# Patient Record
Sex: FEMALE | Race: BLACK OR AFRICAN AMERICAN | NOT HISPANIC OR LATINO | URBAN - METROPOLITAN AREA
[De-identification: names, ages, dates, MRNs, and addresses within clinical notes are randomized per-mention and may not be internally consistent; named-entity substitution may affect disease eponyms.]

---

## 2024-09-18 ENCOUNTER — INPATIENT (INPATIENT)
Facility: HOSPITAL | Age: 74
LOS: 4 days | Discharge: HOME | End: 2024-09-23
Attending: HOSPITALIST | Admitting: HOSPITALIST
Payer: MEDICARE

## 2024-09-18 VITALS
HEART RATE: 94 BPM | WEIGHT: 194.01 LBS | OXYGEN SATURATION: 99 % | SYSTOLIC BLOOD PRESSURE: 119 MMHG | TEMPERATURE: 98 F | DIASTOLIC BLOOD PRESSURE: 90 MMHG | RESPIRATION RATE: 20 BRPM

## 2024-09-18 DIAGNOSIS — Z79.899 OTHER LONG TERM (CURRENT) DRUG THERAPY: ICD-10-CM

## 2024-09-18 DIAGNOSIS — R06.02 SHORTNESS OF BREATH: ICD-10-CM

## 2024-09-18 DIAGNOSIS — Z29.9 ENCOUNTER FOR PROPHYLACTIC MEASURES, UNSPECIFIED: ICD-10-CM

## 2024-09-18 DIAGNOSIS — I50.9 HEART FAILURE, UNSPECIFIED: ICD-10-CM

## 2024-09-18 DIAGNOSIS — I10 ESSENTIAL (PRIMARY) HYPERTENSION: ICD-10-CM

## 2024-09-18 LAB
ALBUMIN SERPL ELPH-MCNC: 3.6 G/DL — SIGNIFICANT CHANGE UP (ref 3.3–5)
ALP SERPL-CCNC: 77 U/L — SIGNIFICANT CHANGE UP (ref 40–120)
ALT FLD-CCNC: 40 U/L — HIGH (ref 4–33)
ANION GAP SERPL CALC-SCNC: 14 MMOL/L — SIGNIFICANT CHANGE UP (ref 7–14)
AST SERPL-CCNC: 45 U/L — HIGH (ref 4–32)
BASOPHILS # BLD AUTO: 0.03 K/UL — SIGNIFICANT CHANGE UP (ref 0–0.2)
BASOPHILS NFR BLD AUTO: 0.6 % — SIGNIFICANT CHANGE UP (ref 0–2)
BILIRUB SERPL-MCNC: 0.7 MG/DL — SIGNIFICANT CHANGE UP (ref 0.2–1.2)
BLD GP AB SCN SERPL QL: NEGATIVE — SIGNIFICANT CHANGE UP
BUN SERPL-MCNC: 22 MG/DL — SIGNIFICANT CHANGE UP (ref 7–23)
CALCIUM SERPL-MCNC: 8.5 MG/DL — SIGNIFICANT CHANGE UP (ref 8.4–10.5)
CHLORIDE SERPL-SCNC: 102 MMOL/L — SIGNIFICANT CHANGE UP (ref 98–107)
CO2 SERPL-SCNC: 22 MMOL/L — SIGNIFICANT CHANGE UP (ref 22–31)
CREAT SERPL-MCNC: 1.16 MG/DL — SIGNIFICANT CHANGE UP (ref 0.5–1.3)
EGFR: 49 ML/MIN/1.73M2 — LOW
EOSINOPHIL # BLD AUTO: 0 K/UL — SIGNIFICANT CHANGE UP (ref 0–0.5)
EOSINOPHIL NFR BLD AUTO: 0 % — SIGNIFICANT CHANGE UP (ref 0–6)
GLUCOSE SERPL-MCNC: 110 MG/DL — HIGH (ref 70–99)
HCT VFR BLD CALC: 31 % — LOW (ref 34.5–45)
HGB BLD-MCNC: 10.5 G/DL — LOW (ref 11.5–15.5)
IANC: 3.47 K/UL — SIGNIFICANT CHANGE UP (ref 1.8–7.4)
IMM GRANULOCYTES NFR BLD AUTO: 0.2 % — SIGNIFICANT CHANGE UP (ref 0–0.9)
LYMPHOCYTES # BLD AUTO: 0.87 K/UL — LOW (ref 1–3.3)
LYMPHOCYTES # BLD AUTO: 18.2 % — SIGNIFICANT CHANGE UP (ref 13–44)
MCHC RBC-ENTMCNC: 29.6 PG — SIGNIFICANT CHANGE UP (ref 27–34)
MCHC RBC-ENTMCNC: 33.9 GM/DL — SIGNIFICANT CHANGE UP (ref 32–36)
MCV RBC AUTO: 87.3 FL — SIGNIFICANT CHANGE UP (ref 80–100)
MONOCYTES # BLD AUTO: 0.4 K/UL — SIGNIFICANT CHANGE UP (ref 0–0.9)
MONOCYTES NFR BLD AUTO: 8.4 % — SIGNIFICANT CHANGE UP (ref 2–14)
NEUTROPHILS # BLD AUTO: 3.47 K/UL — SIGNIFICANT CHANGE UP (ref 1.8–7.4)
NEUTROPHILS NFR BLD AUTO: 72.6 % — SIGNIFICANT CHANGE UP (ref 43–77)
NRBC # BLD: 0 /100 WBCS — SIGNIFICANT CHANGE UP (ref 0–0)
NRBC # FLD: 0 K/UL — SIGNIFICANT CHANGE UP (ref 0–0)
NT-PROBNP SERPL-SCNC: HIGH PG/ML
PLATELET # BLD AUTO: 181 K/UL — SIGNIFICANT CHANGE UP (ref 150–400)
POTASSIUM SERPL-MCNC: 4.6 MMOL/L — SIGNIFICANT CHANGE UP (ref 3.5–5.3)
POTASSIUM SERPL-SCNC: 4.6 MMOL/L — SIGNIFICANT CHANGE UP (ref 3.5–5.3)
PROT SERPL-MCNC: 7 G/DL — SIGNIFICANT CHANGE UP (ref 6–8.3)
RBC # BLD: 3.55 M/UL — LOW (ref 3.8–5.2)
RBC # FLD: 17.7 % — HIGH (ref 10.3–14.5)
RH IG SCN BLD-IMP: POSITIVE — SIGNIFICANT CHANGE UP
SODIUM SERPL-SCNC: 138 MMOL/L — SIGNIFICANT CHANGE UP (ref 135–145)
TROPONIN T, HIGH SENSITIVITY RESULT: 28 NG/L — SIGNIFICANT CHANGE UP
TROPONIN T, HIGH SENSITIVITY RESULT: 29 NG/L — SIGNIFICANT CHANGE UP
WBC # BLD: 4.78 K/UL — SIGNIFICANT CHANGE UP (ref 3.8–10.5)
WBC # FLD AUTO: 4.78 K/UL — SIGNIFICANT CHANGE UP (ref 3.8–10.5)

## 2024-09-18 PROCEDURE — 99285 EMERGENCY DEPT VISIT HI MDM: CPT

## 2024-09-18 PROCEDURE — 71046 X-RAY EXAM CHEST 2 VIEWS: CPT | Mod: 26

## 2024-09-18 PROCEDURE — 71250 CT THORAX DX C-: CPT | Mod: 26

## 2024-09-18 PROCEDURE — 93970 EXTREMITY STUDY: CPT | Mod: 26

## 2024-09-18 PROCEDURE — 99223 1ST HOSP IP/OBS HIGH 75: CPT

## 2024-09-18 RX ORDER — LOSARTAN POTASSIUM 100 MG/1
25 TABLET, FILM COATED ORAL DAILY
Refills: 0 | Status: DISCONTINUED | OUTPATIENT
Start: 2024-09-18 | End: 2024-09-20

## 2024-09-18 RX ORDER — FUROSEMIDE 10 MG/ML
40 INJECTION INTRAVENOUS DAILY
Refills: 0 | Status: DISCONTINUED | OUTPATIENT
Start: 2024-09-19 | End: 2024-09-19

## 2024-09-18 RX ORDER — FUROSEMIDE 10 MG/ML
20 INJECTION INTRAVENOUS
Refills: 0 | Status: DISCONTINUED | OUTPATIENT
Start: 2024-09-18 | End: 2024-09-18

## 2024-09-18 RX ORDER — FUROSEMIDE 10 MG/ML
20 INJECTION INTRAVENOUS ONCE
Refills: 0 | Status: DISCONTINUED | OUTPATIENT
Start: 2024-09-18 | End: 2024-09-19

## 2024-09-18 RX ORDER — ENOXAPARIN SODIUM 150 MG/ML
40 INJECTION SUBCUTANEOUS EVERY 24 HOURS
Refills: 0 | Status: DISCONTINUED | OUTPATIENT
Start: 2024-09-18 | End: 2024-09-22

## 2024-09-18 NOTE — ED PROVIDER NOTE - PROGRESS NOTE DETAILS
labs revealing BNP 17K, CXR revealing b/l effusions, us negative for DVT. plan for admission for new onset heart failure. case d/w teledoc. given ?opacity on CXR, they would like a CT noncon as well. no fevers/leukocytosis. do not suspect PNA vs infectious etiology at this time.

## 2024-09-18 NOTE — H&P ADULT - NSHPPHYSICALEXAM_GEN_ALL_CORE
PHYSICAL EXAM:      Constitutional: NAD, well-groomed, well-developed  HEENT:  EOMI, Normal Hearing  Neck: No LAD, No JVD  Back: Normal spine flexure, No CVA tenderness  Respiratory: CTAB  Cardiovascular: S1 and S2, RRR  Gastrointestinal: BS+, soft, NT/ND  Extremities: + peripheral edema  Vascular: 2+ peripheral pulses  Neurological: A/O x 3, no focal deficits  Psychiatric: Normal mood, normal affect  Musculoskeletal: 5/5 strength b/l upper and lower extremities  Skin: No rashes

## 2024-09-18 NOTE — ED PROVIDER NOTE - OBJECTIVE STATEMENT
74-year-old female PMH of hypertension on losartan, presenting to ED with increasing shortness of breath/dyspnea on exertion over past 3 days associated with bilateral lower extremity edema.  Patient states her leg swelling normally resolves after elevating her feet however they did not resolve and worsened this morning in addition to her breathing prompting ER eval.  Patient denies any chest pain, fevers, cough, sore throat, and/V/D/C, abdominal pain, recent illness.  Patient otherwise denies any recent travel, long car rides, leg swelling, prolonged immobilization, recent surgeries, history of DVT/PE or exogenous hormone use.

## 2024-09-18 NOTE — ED PROVIDER NOTE - PHYSICAL EXAMINATION
CONSTITUTIONAL: Well-appearing; well-nourished; in no apparent distress;  HEAD: Normocephalic, atraumatic;  NECK/LYMPH: Supple; non-tender;  CARD: Normal S1, S2; no murmurs, rubs, or gallops noted  RESP: Normal chest excursion with respiration; breath sounds clear and equal bilaterally; faint crackles at bases b/l  EXT/MS: moves all extremities; distal pulses are normal; 2+ b/l pitting edema  SKIN: Normal for age and race; warm; dry; good turgor; no apparent lesions or exudate noted  NEURO: Awake, alert, oriented x 3, no gross deficits  PSYCH: Normal mood; appropriate affect

## 2024-09-18 NOTE — ED PROVIDER NOTE - ATTENDING APP SHARED VISIT CONTRIBUTION OF CARE
74-year-old female PMH of hypertension on losartan, presenting to ED with increasing shortness of breath/dyspnea on exertion over past 3 days associated with bilateral lower extremity edema.      pt well appearing  satting well on RA  lungs faint crackles at bases  2+ b/l pitting edema  ECG with inferolateral TWI (unknown Prior)  will check labs, trop to r/o atypical acs, bnp, CXR to r/o pleural effusions, US to r/o DVT and likely CDU vs teledoc admission

## 2024-09-18 NOTE — ED ADULT NURSE NOTE - NSFALLUNIVINTERV_ED_ALL_ED
Bed/Stretcher in lowest position, wheels locked, appropriate side rails in place/Call bell, personal items and telephone in reach/Instruct patient to call for assistance before getting out of bed/chair/stretcher/Non-slip footwear applied when patient is off stretcher/[place] to call system/Physically safe environment - no spills, clutter or unnecessary equipment/Purposeful proactive rounding/Room/bathroom lighting operational, light cord in reach

## 2024-09-18 NOTE — ED ADULT NURSE NOTE - OBJECTIVE STATEMENT
pt received spot 17a. pt A+Ox3 c/o GREY and b/l lower extremity edema. respirations even and unlabored. pt denies chest pain. vs as noted. labs sent. IVSL in place. NAD noted. will monitor.

## 2024-09-18 NOTE — ED PROVIDER NOTE - CLINICAL SUMMARY MEDICAL DECISION MAKING FREE TEXT BOX
74-year-old female PMH of hypertension on losartan, presenting to ED with increasing shortness of breath/dyspnea on exertion over past 3 days associated with bilateral lower extremity edema.      pt well appearing  satting well on RA  lungs faint crackles at bases  2+ b/l pitting edema  ECG with inferolateral TWI (unknown Prior)  will check labs, trop to r/o atypical acs, bnp, CXR, and likely CDU vs teledoc admission 74-year-old female PMH of hypertension on losartan, presenting to ED with increasing shortness of breath/dyspnea on exertion over past 3 days associated with bilateral lower extremity edema.      pt well appearing  satting well on RA  lungs faint crackles at bases  2+ b/l pitting edema  ECG with inferolateral TWI (unknown Prior)  will check labs, trop to r/o atypical acs, bnp, CXR to r/o pleural effusions, US to r/o DVT and likely CDU vs teledoc admission

## 2024-09-18 NOTE — H&P ADULT - PROBLEM SELECTOR PLAN 1
-clinical presentation very much suggestive of new-onset chf of unclear etiology  -pt well- appearing, hemodynamically stable. Will trial 20  po lasix bid; titrate up or transition to IV as needed  - Is/Os  -tele, tte -clinical presentation very much suggestive of new-onset chf of unclear etiology  -pt well- appearing, hemodynamically stable. Will trial 20  IV lasix x 1, start 40 po lasix tomorrow;  titrate up or transition to IV as needed  - Is/Os  -tele, tte Equal and normal pulses (carotid, femoral, dorsalis pedis)

## 2024-09-18 NOTE — H&P ADULT - NSHPLABSRESULTS_GEN_ALL_CORE
10.5   4.78  )-----------( 181      ( 18 Sep 2024 11:34 )             31.0     09-18    138  |  102  |  22  ----------------------------<  110[H]  4.6   |  22  |  1.16    Ca    8.5      18 Sep 2024 11:34    TPro  7.0  /  Alb  3.6  /  TBili  0.7  /  DBili  x   /  AST  45[H]  /  ALT  40[H]  /  AlkPhos  77  09-18    CAPILLARY BLOOD GLUCOSE          Urinalysis Basic - ( 18 Sep 2024 11:34 )    Color: x / Appearance: x / SG: x / pH: x  Gluc: 110 mg/dL / Ketone: x  / Bili: x / Urobili: x   Blood: x / Protein: x / Nitrite: x   Leuk Esterase: x / RBC: x / WBC x   Sq Epi: x / Non Sq Epi: x / Bacteria: x      Vital Signs Last 24 Hrs  T(C): 36.8 (18 Sep 2024 16:48), Max: 36.8 (18 Sep 2024 16:48)  T(F): 98.3 (18 Sep 2024 16:48), Max: 98.3 (18 Sep 2024 16:48)  HR: 92 (18 Sep 2024 16:48) (85 - 94)  BP: 136/91 (18 Sep 2024 16:48) (119/90 - 136/91)  BP(mean): --  RR: 16 (18 Sep 2024 16:48) (16 - 20)  SpO2: 96% (18 Sep 2024 16:48) (96% - 99%)    Parameters below as of 18 Sep 2024 16:48  Patient On (Oxygen Delivery Method): room air

## 2024-09-18 NOTE — H&P ADULT - NSHPREVIEWOFSYSTEMS_GEN_ALL_CORE
Review of Systems:   CONSTITUTIONAL: No fever  EYES: No eye pain, visual disturbances, or discharge  ENMT:  No difficulty hearing, tinnitus, vertigo; No sinus or throat pain  NECK: No pain or stiffness  RESPIRATORY: No cough, wheezing, chills or hemoptysis; exertional  shortness of breath and when supine  CARDIOVASCULAR: No chest pain, palpitations, dizziness; +leg swelling  GASTROINTESTINAL: No abdominal or epigastric pain. No nausea, vomiting, or hematemesis; No diarrhea or constipation. No melena or hematochezia.  GENITOURINARY: No dysuria  NEUROLOGICAL: No headaches  MUSCULOSKELETAL: No joint pain or swelling; No muscle, back, or extremity pain

## 2024-09-18 NOTE — H&P ADULT - HISTORY OF PRESENT ILLNESS
74-year-old female PMH of hypertension on losartan, presenting to ED with increasing shortness of breath/dyspnea on exertion/ orthopnea  over past 3 days associated with bilateral lower extremity edema.  Patient states her leg swelling normally resolves after elevating her feet however they did not resolve and worsened this morning in addition to her breathing prompting ER eval.  Patient denies any chest pain, fevers, cough, sore throat, and/V/D/C, abdominal pain, recent illness.  Patient otherwise denies any recent travel, long car rides, leg swelling, prolonged immobilization, recent surgeries, history of DVT/PE or exogenous hormone use. Duplex neg for dvt. EKG NSR with lateral twi (no prior for comparison) Trop 29, 28; pBNP  approx 18k. CXR + BL pl eff, R>L  Denies h/o cad, or prior cad workup

## 2024-09-18 NOTE — ED ADULT NURSE REASSESSMENT NOTE - NS ED NURSE REASSESS COMMENT FT1
Break RN: Patient resting in stretcher, at baseline mental status, no acute distress noted at this time. Respirations equal and unlabored. Care plan continued. Comfort measures provided. Safety maintained. Awaiting CT and bed placement.

## 2024-09-18 NOTE — ED ADULT TRIAGE NOTE - CHIEF COMPLAINT QUOTE
Pt presenting to ED for GREY and BI lateral lower extremity swelling starting 3 days ago. Phx HTN, Pt breathing mildly labored in triage, 02 99% on RA.

## 2024-09-19 LAB
A1C WITH ESTIMATED AVERAGE GLUCOSE RESULT: 6 % — HIGH (ref 4–5.6)
APTT BLD: 28.1 SEC — SIGNIFICANT CHANGE UP (ref 24.5–35.6)
BASOPHILS # BLD AUTO: 0.04 K/UL — SIGNIFICANT CHANGE UP (ref 0–0.2)
BASOPHILS NFR BLD AUTO: 0.7 % — SIGNIFICANT CHANGE UP (ref 0–2)
CHOLEST SERPL-MCNC: 153 MG/DL — SIGNIFICANT CHANGE UP
EOSINOPHIL # BLD AUTO: 0.15 K/UL — SIGNIFICANT CHANGE UP (ref 0–0.5)
EOSINOPHIL NFR BLD AUTO: 2.8 % — SIGNIFICANT CHANGE UP (ref 0–6)
ESTIMATED AVERAGE GLUCOSE: 126 — SIGNIFICANT CHANGE UP
HCT VFR BLD CALC: 30.9 % — LOW (ref 34.5–45)
HDLC SERPL-MCNC: 43 MG/DL — LOW
HGB BLD-MCNC: 10.3 G/DL — LOW (ref 11.5–15.5)
IANC: 3.09 K/UL — SIGNIFICANT CHANGE UP (ref 1.8–7.4)
IMM GRANULOCYTES NFR BLD AUTO: 0.2 % — SIGNIFICANT CHANGE UP (ref 0–0.9)
INR BLD: 1.24 RATIO — HIGH (ref 0.85–1.18)
LIPID PNL WITH DIRECT LDL SERPL: 94 MG/DL — SIGNIFICANT CHANGE UP
LYMPHOCYTES # BLD AUTO: 1.58 K/UL — SIGNIFICANT CHANGE UP (ref 1–3.3)
LYMPHOCYTES # BLD AUTO: 29.3 % — SIGNIFICANT CHANGE UP (ref 13–44)
MAGNESIUM SERPL-MCNC: 2 MG/DL — SIGNIFICANT CHANGE UP (ref 1.6–2.6)
MCHC RBC-ENTMCNC: 28.9 PG — SIGNIFICANT CHANGE UP (ref 27–34)
MCHC RBC-ENTMCNC: 33.3 GM/DL — SIGNIFICANT CHANGE UP (ref 32–36)
MCV RBC AUTO: 86.6 FL — SIGNIFICANT CHANGE UP (ref 80–100)
MONOCYTES # BLD AUTO: 0.52 K/UL — SIGNIFICANT CHANGE UP (ref 0–0.9)
MONOCYTES NFR BLD AUTO: 9.6 % — SIGNIFICANT CHANGE UP (ref 2–14)
NEUTROPHILS # BLD AUTO: 3.09 K/UL — SIGNIFICANT CHANGE UP (ref 1.8–7.4)
NEUTROPHILS NFR BLD AUTO: 57.4 % — SIGNIFICANT CHANGE UP (ref 43–77)
NON HDL CHOLESTEROL: 110 MG/DL — SIGNIFICANT CHANGE UP
NRBC # BLD: 0 /100 WBCS — SIGNIFICANT CHANGE UP (ref 0–0)
NRBC # FLD: 0 K/UL — SIGNIFICANT CHANGE UP (ref 0–0)
PHOSPHATE SERPL-MCNC: 3.6 MG/DL — SIGNIFICANT CHANGE UP (ref 2.5–4.5)
PLATELET # BLD AUTO: 178 K/UL — SIGNIFICANT CHANGE UP (ref 150–400)
PROTHROM AB SERPL-ACNC: 13.8 SEC — HIGH (ref 9.5–13)
RBC # BLD: 3.57 M/UL — LOW (ref 3.8–5.2)
RBC # FLD: 17.6 % — HIGH (ref 10.3–14.5)
TRIGL SERPL-MCNC: 81 MG/DL — SIGNIFICANT CHANGE UP
WBC # BLD: 5.39 K/UL — SIGNIFICANT CHANGE UP (ref 3.8–10.5)
WBC # FLD AUTO: 5.39 K/UL — SIGNIFICANT CHANGE UP (ref 3.8–10.5)

## 2024-09-19 RX ORDER — FUROSEMIDE 10 MG/ML
40 INJECTION INTRAVENOUS
Refills: 0 | Status: DISCONTINUED | OUTPATIENT
Start: 2024-09-19 | End: 2024-09-22

## 2024-09-19 RX ORDER — ACETAMINOPHEN 325 MG
650 TABLET ORAL EVERY 6 HOURS
Refills: 0 | Status: DISCONTINUED | OUTPATIENT
Start: 2024-09-19 | End: 2024-09-23

## 2024-09-19 RX ADMIN — LOSARTAN POTASSIUM 25 MILLIGRAM(S): 100 TABLET, FILM COATED ORAL at 04:34

## 2024-09-19 RX ADMIN — ENOXAPARIN SODIUM 40 MILLIGRAM(S): 150 INJECTION SUBCUTANEOUS at 04:34

## 2024-09-19 RX ADMIN — FUROSEMIDE 40 MILLIGRAM(S): 10 INJECTION INTRAVENOUS at 04:34

## 2024-09-19 RX ADMIN — FUROSEMIDE 40 MILLIGRAM(S): 10 INJECTION INTRAVENOUS at 16:27

## 2024-09-19 NOTE — CONSULT NOTE ADULT - NS ATTEND AMEND GEN_ALL_CORE FT
Patient seen and examined. Agree with plan as detailed in PA/NP Note.     Grossly volume overloaded, lasix naive, can start 40 mg IV BID for now    Damian Ching MD  Pager: [phone]

## 2024-09-19 NOTE — PATIENT PROFILE ADULT - FALL HARM RISK - UNIVERSAL INTERVENTIONS
Bed in lowest position, wheels locked, appropriate side rails in place/Call bell, personal items and telephone in reach/Instruct patient to call for assistance before getting out of bed or chair/Non-slip footwear when patient is out of bed/[place] to call system/Physically safe environment - no spills, clutter or unnecessary equipment/Purposeful Proactive Rounding/Room/bathroom lighting operational, light cord in reach

## 2024-09-20 LAB
ANION GAP SERPL CALC-SCNC: 14 MMOL/L — SIGNIFICANT CHANGE UP (ref 7–14)
APPEARANCE UR: CLEAR — SIGNIFICANT CHANGE UP
BACTERIA # UR AUTO: NEGATIVE /HPF — SIGNIFICANT CHANGE UP
BASOPHILS # BLD AUTO: 0.05 K/UL — SIGNIFICANT CHANGE UP (ref 0–0.2)
BASOPHILS NFR BLD AUTO: 1 % — SIGNIFICANT CHANGE UP (ref 0–2)
BILIRUB UR-MCNC: NEGATIVE — SIGNIFICANT CHANGE UP
BUN SERPL-MCNC: 25 MG/DL — HIGH (ref 7–23)
CALCIUM SERPL-MCNC: 8.7 MG/DL — SIGNIFICANT CHANGE UP (ref 8.4–10.5)
CAST: 1 /LPF — SIGNIFICANT CHANGE UP (ref 0–4)
CHLORIDE SERPL-SCNC: 101 MMOL/L — SIGNIFICANT CHANGE UP (ref 98–107)
CO2 SERPL-SCNC: 26 MMOL/L — SIGNIFICANT CHANGE UP (ref 22–31)
COLOR SPEC: YELLOW — SIGNIFICANT CHANGE UP
CREAT ?TM UR-MCNC: 12 MG/DL — SIGNIFICANT CHANGE UP
CREAT SERPL-MCNC: 1.3 MG/DL — SIGNIFICANT CHANGE UP (ref 0.5–1.3)
DIFF PNL FLD: NEGATIVE — SIGNIFICANT CHANGE UP
EGFR: 43 ML/MIN/1.73M2 — LOW
EOSINOPHIL # BLD AUTO: 0.26 K/UL — SIGNIFICANT CHANGE UP (ref 0–0.5)
EOSINOPHIL NFR BLD AUTO: 5.5 % — SIGNIFICANT CHANGE UP (ref 0–6)
GLUCOSE SERPL-MCNC: 91 MG/DL — SIGNIFICANT CHANGE UP (ref 70–99)
GLUCOSE UR QL: NEGATIVE MG/DL — SIGNIFICANT CHANGE UP
HCT VFR BLD CALC: 31 % — LOW (ref 34.5–45)
HGB BLD-MCNC: 10.3 G/DL — LOW (ref 11.5–15.5)
IANC: 2.54 K/UL — SIGNIFICANT CHANGE UP (ref 1.8–7.4)
IMM GRANULOCYTES NFR BLD AUTO: 0.4 % — SIGNIFICANT CHANGE UP (ref 0–0.9)
KETONES UR-MCNC: NEGATIVE MG/DL — SIGNIFICANT CHANGE UP
LEUKOCYTE ESTERASE UR-ACNC: NEGATIVE — SIGNIFICANT CHANGE UP
LYMPHOCYTES # BLD AUTO: 1.4 K/UL — SIGNIFICANT CHANGE UP (ref 1–3.3)
LYMPHOCYTES # BLD AUTO: 29.4 % — SIGNIFICANT CHANGE UP (ref 13–44)
MAGNESIUM SERPL-MCNC: 1.8 MG/DL — SIGNIFICANT CHANGE UP (ref 1.6–2.6)
MCHC RBC-ENTMCNC: 28.9 PG — SIGNIFICANT CHANGE UP (ref 27–34)
MCHC RBC-ENTMCNC: 33.2 GM/DL — SIGNIFICANT CHANGE UP (ref 32–36)
MCV RBC AUTO: 87.1 FL — SIGNIFICANT CHANGE UP (ref 80–100)
MONOCYTES # BLD AUTO: 0.5 K/UL — SIGNIFICANT CHANGE UP (ref 0–0.9)
MONOCYTES NFR BLD AUTO: 10.5 % — SIGNIFICANT CHANGE UP (ref 2–14)
NEUTROPHILS # BLD AUTO: 2.54 K/UL — SIGNIFICANT CHANGE UP (ref 1.8–7.4)
NEUTROPHILS NFR BLD AUTO: 53.2 % — SIGNIFICANT CHANGE UP (ref 43–77)
NITRITE UR-MCNC: NEGATIVE — SIGNIFICANT CHANGE UP
NRBC # BLD: 0 /100 WBCS — SIGNIFICANT CHANGE UP (ref 0–0)
NRBC # FLD: 0 K/UL — SIGNIFICANT CHANGE UP (ref 0–0)
PH UR: 6.5 — SIGNIFICANT CHANGE UP (ref 5–8)
PHOSPHATE SERPL-MCNC: 4.4 MG/DL — SIGNIFICANT CHANGE UP (ref 2.5–4.5)
PLATELET # BLD AUTO: 182 K/UL — SIGNIFICANT CHANGE UP (ref 150–400)
POTASSIUM SERPL-MCNC: 3.9 MMOL/L — SIGNIFICANT CHANGE UP (ref 3.5–5.3)
POTASSIUM SERPL-SCNC: 3.9 MMOL/L — SIGNIFICANT CHANGE UP (ref 3.5–5.3)
PROT ?TM UR-MCNC: <4 MG/DL — SIGNIFICANT CHANGE UP
PROT UR-MCNC: NEGATIVE MG/DL — SIGNIFICANT CHANGE UP
PROT/CREAT UR-RTO: SIGNIFICANT CHANGE UP RATIO (ref 0–0.2)
RBC # BLD: 3.56 M/UL — LOW (ref 3.8–5.2)
RBC # FLD: 17.5 % — HIGH (ref 10.3–14.5)
RBC CASTS # UR COMP ASSIST: 0 /HPF — SIGNIFICANT CHANGE UP (ref 0–4)
SODIUM SERPL-SCNC: 141 MMOL/L — SIGNIFICANT CHANGE UP (ref 135–145)
SODIUM UR-SCNC: 115 MMOL/L — SIGNIFICANT CHANGE UP
SP GR SPEC: 1.01 — SIGNIFICANT CHANGE UP (ref 1–1.03)
SQUAMOUS # UR AUTO: 0 /HPF — SIGNIFICANT CHANGE UP (ref 0–5)
UROBILINOGEN FLD QL: 0.2 MG/DL — SIGNIFICANT CHANGE UP (ref 0.2–1)
UUN UR-MCNC: 103.4 MG/DL — SIGNIFICANT CHANGE UP
WBC # BLD: 4.77 K/UL — SIGNIFICANT CHANGE UP (ref 3.8–10.5)
WBC # FLD AUTO: 4.77 K/UL — SIGNIFICANT CHANGE UP (ref 3.8–10.5)
WBC UR QL: 1 /HPF — SIGNIFICANT CHANGE UP (ref 0–5)

## 2024-09-20 PROCEDURE — 93356 MYOCRD STRAIN IMG SPCKL TRCK: CPT

## 2024-09-20 PROCEDURE — 76770 US EXAM ABDO BACK WALL COMP: CPT | Mod: 26

## 2024-09-20 PROCEDURE — 93306 TTE W/DOPPLER COMPLETE: CPT | Mod: 26

## 2024-09-20 RX ORDER — LOSARTAN POTASSIUM 100 MG/1
25 TABLET, FILM COATED ORAL AT BEDTIME
Refills: 0 | Status: DISCONTINUED | OUTPATIENT
Start: 2024-09-21 | End: 2024-09-23

## 2024-09-20 RX ADMIN — FUROSEMIDE 40 MILLIGRAM(S): 10 INJECTION INTRAVENOUS at 14:34

## 2024-09-20 RX ADMIN — FUROSEMIDE 40 MILLIGRAM(S): 10 INJECTION INTRAVENOUS at 06:17

## 2024-09-20 RX ADMIN — ENOXAPARIN SODIUM 40 MILLIGRAM(S): 150 INJECTION SUBCUTANEOUS at 04:18

## 2024-09-20 RX ADMIN — LOSARTAN POTASSIUM 25 MILLIGRAM(S): 100 TABLET, FILM COATED ORAL at 06:17

## 2024-09-20 NOTE — CONSULT NOTE ADULT - ASSESSMENT
74-year-old female PMH of hypertension on losartan, presenting to ED with increasing shortness of breath/dyspnea on exertion/ orthopnea  over past 3 days associated with bilateral lower extremity edema.      Acute Pulmonary Edema  Acute Systolic Congestive Failure    iv Lasix   Echo reviewed EF 13 %  Pulm HTN/ Pleural Effusions  HTN  Continue Losartan   Pulm Cards  following     
74-year-old female PMH of hypertension on losartan, presenting to ED with increasing shortness of breath/dyspnea on exertion/ orthopnea  over past 3 days associated with bilateral lower extremity edema.  Patient states her leg swelling normally resolves after elevating her feet however they did not resolve and worsened this morning in addition to her breathing prompting ER eval.  Patient denies any chest pain, fevers, cough, sore throat, and/V/D/C, abdominal pain, recent illness.  Patient otherwise denies any recent travel, long car rides, leg swelling, prolonged immobilization, recent surgeries, history of DVT/PE or exogenous hormone use. Duplex neg for dvt. EKG NSR with lateral twi (no prior for comparison) Trop 29, 28; pBNP  approx 18k. CXR + BL pl eff, R>L  Denies h/o cad, or prior cad workup (18 Sep 2024 17:19)    she says she came in with increasing sob at home with increasing leg edema;   she denies having any underlying lung disease;  she never smoked;   -she is saying she is feeling better and her sob is much better     SOB/GREY/LEG EDEMA  HTN      SOB/GREY/LEG EDEMA  -Her sob  is likely due to chf exacerbation;   -her probnp is very high ; on diuretics;   -ct chest with no pneumonia  : has lucien effusions:  r> l;  -I dont think she needs =tap at thi time:   -needs echo : cont lasix;  cards following   -CT scan chest noted;  showed  No evidence of pneumonia. Moderate right and trace left pleural effusions and perihilar ground  glass opacities consistent with congestion.  HTN  -controlled; losartan 25 milliGRAM(s) Oral daily    dvt prophylaxis  dw team
74y Female with history of HTN presents with SOB. Nephrology consulted for volume overload.    1) EVERETT: Suspect EVERETT in setting of CRS given volume overload on exam versus underlying CKD-3a secondary to HTN (baseline Scr unknown). Check UA, urine urea and urine creatinine. Check spot urine TP/CR. Will consider renal imaging if Scr fails to improve. Avoid nephrotoxins.    2) CKD-3a: As above. Monitor electrolytes.    3) HTN: BP controlled. Reasonable to continue with low dose ARB but will consider discontinuation if patient hypotensive during diuresis.     4) LE edema: Patient with significant volume overload. Check spot urine TP/CR r/o proteinuria. Check TTE. Plan to start lasix 40 mg IV twice daily. Monitor [facility] NEPHROLOGY  Kannan Gimenez M.D.  Mike Villa D.O.  Kalina Varma M.D.  MD Bernadette Jalloh, MSN, ANP-C    Telephone: [phone]  Facsimile: [phone] [place] [address], [address]  [place] [id number]

## 2024-09-21 LAB
ANION GAP SERPL CALC-SCNC: 14 MMOL/L — SIGNIFICANT CHANGE UP (ref 7–14)
BUN SERPL-MCNC: 26 MG/DL — HIGH (ref 7–23)
CALCIUM SERPL-MCNC: 8.8 MG/DL — SIGNIFICANT CHANGE UP (ref 8.4–10.5)
CHLORIDE SERPL-SCNC: 101 MMOL/L — SIGNIFICANT CHANGE UP (ref 98–107)
CO2 SERPL-SCNC: 26 MMOL/L — SIGNIFICANT CHANGE UP (ref 22–31)
CREAT SERPL-MCNC: 1.17 MG/DL — SIGNIFICANT CHANGE UP (ref 0.5–1.3)
EGFR: 49 ML/MIN/1.73M2 — LOW
GLUCOSE SERPL-MCNC: 103 MG/DL — HIGH (ref 70–99)
HCT VFR BLD CALC: 31.4 % — LOW (ref 34.5–45)
HGB BLD-MCNC: 10.6 G/DL — LOW (ref 11.5–15.5)
MAGNESIUM SERPL-MCNC: 1.7 MG/DL — SIGNIFICANT CHANGE UP (ref 1.6–2.6)
MCHC RBC-ENTMCNC: 29.3 PG — SIGNIFICANT CHANGE UP (ref 27–34)
MCHC RBC-ENTMCNC: 33.8 GM/DL — SIGNIFICANT CHANGE UP (ref 32–36)
MCV RBC AUTO: 86.7 FL — SIGNIFICANT CHANGE UP (ref 80–100)
NRBC # BLD: 0 /100 WBCS — SIGNIFICANT CHANGE UP (ref 0–0)
NRBC # FLD: 0 K/UL — SIGNIFICANT CHANGE UP (ref 0–0)
PHOSPHATE SERPL-MCNC: 4.8 MG/DL — HIGH (ref 2.5–4.5)
PLATELET # BLD AUTO: 200 K/UL — SIGNIFICANT CHANGE UP (ref 150–400)
POTASSIUM SERPL-MCNC: 3.7 MMOL/L — SIGNIFICANT CHANGE UP (ref 3.5–5.3)
POTASSIUM SERPL-SCNC: 3.7 MMOL/L — SIGNIFICANT CHANGE UP (ref 3.5–5.3)
RBC # BLD: 3.62 M/UL — LOW (ref 3.8–5.2)
RBC # FLD: 17.5 % — HIGH (ref 10.3–14.5)
SODIUM SERPL-SCNC: 141 MMOL/L — SIGNIFICANT CHANGE UP (ref 135–145)
WBC # BLD: 4.91 K/UL — SIGNIFICANT CHANGE UP (ref 3.8–10.5)
WBC # FLD AUTO: 4.91 K/UL — SIGNIFICANT CHANGE UP (ref 3.8–10.5)

## 2024-09-21 RX ORDER — METOPROLOL TARTRATE 50 MG
25 TABLET ORAL DAILY
Refills: 0 | Status: DISCONTINUED | OUTPATIENT
Start: 2024-09-21 | End: 2024-09-23

## 2024-09-21 RX ORDER — SPIRONOLACTONE 100 MG/1
25 TABLET, FILM COATED ORAL DAILY
Refills: 0 | Status: DISCONTINUED | OUTPATIENT
Start: 2024-09-21 | End: 2024-09-23

## 2024-09-21 RX ADMIN — Medication 20 MILLIEQUIVALENT(S): at 15:49

## 2024-09-21 RX ADMIN — FUROSEMIDE 40 MILLIGRAM(S): 10 INJECTION INTRAVENOUS at 08:27

## 2024-09-21 RX ADMIN — FUROSEMIDE 40 MILLIGRAM(S): 10 INJECTION INTRAVENOUS at 22:07

## 2024-09-21 RX ADMIN — ENOXAPARIN SODIUM 40 MILLIGRAM(S): 150 INJECTION SUBCUTANEOUS at 05:16

## 2024-09-21 RX ADMIN — LOSARTAN POTASSIUM 25 MILLIGRAM(S): 100 TABLET, FILM COATED ORAL at 21:23

## 2024-09-21 NOTE — PROVIDER CONTACT NOTE (OTHER) - ASSESSMENT
T 97.7. P 81. BP 94/67. R 18. O2 100%. patient asymptomatic.
patient asymptomatic
Patient is asymptomatic.
T 97.7. P 81. BP 94/67. R 18. O2 100%. patient asymptomatic.

## 2024-09-21 NOTE — PROVIDER CONTACT NOTE (OTHER) - BACKGROUND
Patient admitted for SOB. PMH HTN.

## 2024-09-21 NOTE — PROVIDER CONTACT NOTE (OTHER) - ACTION/TREATMENT ORDERED:
no new orders given at this time
no new orders given at this time
Draw AM labs stat.
reschedule furosemide to 08:00

## 2024-09-22 LAB
ANION GAP SERPL CALC-SCNC: 11 MMOL/L — SIGNIFICANT CHANGE UP (ref 7–14)
BUN SERPL-MCNC: 26 MG/DL — HIGH (ref 7–23)
CALCIUM SERPL-MCNC: 9.1 MG/DL — SIGNIFICANT CHANGE UP (ref 8.4–10.5)
CHLORIDE SERPL-SCNC: 98 MMOL/L — SIGNIFICANT CHANGE UP (ref 98–107)
CO2 SERPL-SCNC: 31 MMOL/L — SIGNIFICANT CHANGE UP (ref 22–31)
CREAT SERPL-MCNC: 1.16 MG/DL — SIGNIFICANT CHANGE UP (ref 0.5–1.3)
EGFR: 49 ML/MIN/1.73M2 — LOW
GLUCOSE SERPL-MCNC: 92 MG/DL — SIGNIFICANT CHANGE UP (ref 70–99)
HCT VFR BLD CALC: 35.3 % — SIGNIFICANT CHANGE UP (ref 34.5–45)
HGB BLD-MCNC: 11.9 G/DL — SIGNIFICANT CHANGE UP (ref 11.5–15.5)
MAGNESIUM SERPL-MCNC: 1.7 MG/DL — SIGNIFICANT CHANGE UP (ref 1.6–2.6)
MCHC RBC-ENTMCNC: 29.2 PG — SIGNIFICANT CHANGE UP (ref 27–34)
MCHC RBC-ENTMCNC: 33.7 GM/DL — SIGNIFICANT CHANGE UP (ref 32–36)
MCV RBC AUTO: 86.7 FL — SIGNIFICANT CHANGE UP (ref 80–100)
NRBC # BLD: 0 /100 WBCS — SIGNIFICANT CHANGE UP (ref 0–0)
NRBC # FLD: 0 K/UL — SIGNIFICANT CHANGE UP (ref 0–0)
PHOSPHATE SERPL-MCNC: 4.2 MG/DL — SIGNIFICANT CHANGE UP (ref 2.5–4.5)
PLATELET # BLD AUTO: 221 K/UL — SIGNIFICANT CHANGE UP (ref 150–400)
POTASSIUM SERPL-MCNC: 4.1 MMOL/L — SIGNIFICANT CHANGE UP (ref 3.5–5.3)
POTASSIUM SERPL-SCNC: 4.1 MMOL/L — SIGNIFICANT CHANGE UP (ref 3.5–5.3)
RBC # BLD: 4.07 M/UL — SIGNIFICANT CHANGE UP (ref 3.8–5.2)
RBC # FLD: 17.5 % — HIGH (ref 10.3–14.5)
SODIUM SERPL-SCNC: 140 MMOL/L — SIGNIFICANT CHANGE UP (ref 135–145)
WBC # BLD: 3.91 K/UL — SIGNIFICANT CHANGE UP (ref 3.8–10.5)
WBC # FLD AUTO: 3.91 K/UL — SIGNIFICANT CHANGE UP (ref 3.8–10.5)

## 2024-09-22 RX ORDER — FUROSEMIDE 10 MG/ML
80 INJECTION INTRAVENOUS DAILY
Refills: 0 | Status: DISCONTINUED | OUTPATIENT
Start: 2024-09-22 | End: 2024-09-23

## 2024-09-22 RX ADMIN — Medication 5000 UNIT(S): at 21:03

## 2024-09-22 RX ADMIN — Medication 5000 UNIT(S): at 14:21

## 2024-09-22 RX ADMIN — SPIRONOLACTONE 25 MILLIGRAM(S): 100 TABLET, FILM COATED ORAL at 05:41

## 2024-09-22 RX ADMIN — LOSARTAN POTASSIUM 25 MILLIGRAM(S): 100 TABLET, FILM COATED ORAL at 21:02

## 2024-09-22 RX ADMIN — Medication 25 MILLIGRAM(S): at 05:41

## 2024-09-22 RX ADMIN — FUROSEMIDE 80 MILLIGRAM(S): 10 INJECTION INTRAVENOUS at 15:28

## 2024-09-22 RX ADMIN — ENOXAPARIN SODIUM 40 MILLIGRAM(S): 150 INJECTION SUBCUTANEOUS at 05:41

## 2024-09-22 NOTE — PROVIDER CONTACT NOTE (MEDICATION) - ASSESSMENT
Pt refusing Lasix IV push stating "I am ready and almost ready for the pill" Pt refusing Lasix IV push stating "I am ready and almost ready for the pill". Pt also refused vitals because she said it was just done.

## 2024-09-22 NOTE — CONSULT NOTE ADULT - REASON FOR ADMISSION
suspected new onset CHF
Normal rate, regular rhythm.  Heart sounds S1, S2.  No murmurs, rubs or gallops.

## 2024-09-22 NOTE — PROGRESS NOTE ADULT - NS ATTEND AMEND GEN_ALL_CORE FT
Patient seen and examined. Agree with plan as detailed in PA/NP Note.     Damian Ching MD  Pager: [phone]
Patient seen and examined. Agree with plan as detailed in PA/NP Note.     Damian Ching MD  Pager: [phone]

## 2024-09-22 NOTE — CONSULT NOTE ADULT - SUBJECTIVE AND OBJECTIVE BOX
date of consult 9/19/24    HISTORY OF PRESENT ILLNESS: HPI:  74-year-old female PMH of hypertension on losartan, presenting to ED with increasing shortness of breath/dyspnea on exertion/ orthopnea  over past 3 days associated with bilateral lower extremity edema.  Patient states her leg swelling normally resolves after elevating her feet however they did not resolve and worsened this morning in addition to her breathing prompting ER eval.  Patient denies any chest pain, fevers, cough, sore throat, and/V/D/C, abdominal pain, recent illness.  Patient otherwise denies any recent travel, long car rides, leg swelling, prolonged immobilization, recent surgeries, history of DVT/PE or exogenous hormone use. Duplex neg for dvt. EKG NSR with lateral twi (no prior for comparison) Trop 29, 28; pBNP  approx 18k. CXR + BL pl eff, R>L  Denies h/o cad, or prior cad workup (18 Sep 2024 17:19)      PAST MEDICAL & SURGICAL HISTORY:  HTN (hypertension)      MEDICATIONS  (STANDING):  enoxaparin Injectable 40 milliGRAM(s) SubCutaneous every 24 hours  furosemide    Tablet 40 milliGRAM(s) Oral daily  furosemide   Injectable 20 milliGRAM(s) IV Push once  losartan 25 milliGRAM(s) Oral daily      Allergies  No Known Allergies      FAMILY HISTORY:  Non-contributary for premature coronary disease or sudden cardiac death    SOCIAL HISTORY:    [x ] Non-smoker  [ ] Smoker  [ ] Alcohol    FLU VACCINE THIS YEAR STARTS IN AUGUST:  [ ] Yes    [ ] No    IF OVER 65 HAVE YOU EVER HAD A PNA VACCINE:  [ ] Yes    [ ] No       [ ] N/A      REVIEW OF SYSTEMS:  [ ]chest pain  [  ]shortness of breath  [  ]palpitations  [  ]syncope  [ ]near syncope [ ]upper extremity weakness   [ ] lower extremity weakness  [  ]diplopia  [  ]altered mental status   [  ]fevers  [ ]chills [ ]nausea  [ ]vomiting  [  ]dysphagia    [ ]abdominal pain  [ ]melena  [ ]BRBPR    [  ]epistaxis  [  ]rash    [ ]lower extremity edema        [x ] All others negative	  [ ] Unable to obtain      LABS:	 	    CARDIAC MARKERS:  Troponin T, High Sensitivity Result: 29, 28                          10.3   5.39  )-----------( 178      ( 19 Sep 2024 04:49 )             30.9     138  |  102  |  22  ----------------------------<  110[H]  4.6   |  22  |  1.16    Ca    8.5      18 Sep 2024 11:34  Phos  3.6     09-19  Mg     2.00     09-19    TPro  7.0  /  Alb  3.6  /  TBili  0.7  /  DBili  x   /  AST  45[H]  /  ALT  40[H]  /  AlkPhos  77  09-18    Creatinine Trend: 1.16<--    Coags:  PT/INR - ( 19 Sep 2024 04:49 )   PT: 13.8 sec;   INR: 1.24 ratio      PTT - ( 19 Sep 2024 04:49 )  PTT:28.1 sec    PHYSICAL EXAM:  T(C): 36.7 (09-19-24 @ 16:03), Max: 37.3 (09-18-24 @ 21:43)  HR: 88 (09-19-24 @ 16:03) (88 - 99)  BP: 116/64 (09-19-24 @ 16:03) (116/64 - 142/97)  RR: 20 (09-19-24 @ 16:03) (16 - 20)  SpO2: 98% (09-19-24 @ 16:03) (96% - 100%)    Gen: Appears well in NAD  HEENT:  (-)icterus (-)pallor  CV: N S1 S2 1/6 ALEXANDER (+)2 Pulses B/l  Resp:  decreased BS bases  GI: (+) BS Soft, NT, ND  Lymph:  (2+) LE pitting Edema, (-)obvious lymphadenopathy  Skin: Warm to touch, Normal turgor  Psych: Appropriate mood and affect  	      ECG: NSR  	    < from: CT Chest No Cont (09.18.24 @ 17:50) >  IMPRESSION:  No evidence of pneumonia.    Moderate right and trace left pleural effusions and perihilar ground   glass opacities consistent with congestion.    < end of copied text >        ASSESSMENT/PLAN: 74-year-old female PMH of hypertension on losartan, presenting to ED with increasing shortness of breath/dyspnea on exertion/ orthopnea  over past 3 days associated with bilateral lower extremity edema.     Acute CHF  --admit to tele  --check TTE  --Start Lasix 40 IV BID  --Cont Losartan for HTN  --Pulm eval for Pleural effusion  --Medicine consult  --ischemic eval pending above/ when euvolemic    Nataly PETERSEN  [phone]                         
EP ATTENDING      HISTORY OF PRESENT ILLNESS: She is a pleasant 73 y/o female admitted with newly diagnosed severe LV dysfunction (LVEF 10-15%) resulting in severe functional MR. Cath pending. EP is now called for brief episodes of NSVT. She denies syncope nor cardiac arrest.      PAST MEDICAL & SURGICAL HISTORY:  HTN (hypertension)  new severe LV dysfunction with severe functional MR    MEDICATIONS  (STANDING):  furosemide    Tablet 80 milliGRAM(s) Oral daily  heparin   Injectable 5000 Unit(s) SubCutaneous every 8 hours  losartan 25 milliGRAM(s) Oral at bedtime  metoprolol succinate ER 25 milliGRAM(s) Oral daily  spironolactone 25 milliGRAM(s) Oral daily      NKDA        SOCIAL HISTORY:    [x ] Non-smoker  [ ] Smoker  [ ] Alcohol      REVIEW OF SYSTEMS:  [ ]chest pain  [  ]shortness of breath  [  ]palpitations  [  ]syncope  [ ]near syncope [ ]upper extremity weakness   [ ] lower extremity weakness  [  ]diplopia  [  ]altered mental status   [  ]fevers  [ ]chills [ ]nausea  [ ]vomitting  [  ]dysphagia    [ ]abdominal pain  [ ]melena  [ ]BRBPR    [  ]epistaxis  [  ]rash    [ ]lower extremity edema        [x ] All others negative	  [ ] Unable to obtain    PHYSICAL EXAM:  T(C): 36.6 (09-22-24 @ 05:40), Max: 36.9 (09-21-24 @ 16:00)  HR: 85 (09-22-24 @ 05:40) (85 - 105)  BP: 117/73 (09-22-24 @ 05:40) (117/73 - 129/78)  RR: 16 (09-22-24 @ 05:40) (16 - 18)  SpO2: 100% (09-22-24 @ 05:40) (100% - 100%)  Wt(kg): --    Appearance: Normal	  HEENT:   Normal oral mucosa, PERRL, EOMI	  Lymphatic: No lymphadenopathy , no edema  Cardiovascular: Normal S1 S2, No JVD, No murmurs , Peripheral pulses palpable 2+ bilaterally  Respiratory: Lungs clear to auscultation, normal effort 	  Gastrointestinal:  Soft, Non-tender, + BS	  Skin: No rashes, No ecchymoses, No cyanosis, warm to touch  Musculoskeletal: Normal range of motion, normal strength  Psychiatry:  Mood & affect appropriate      TELEMETRY: 	    ECG:  	    Echo:  NST:  Cath:  	  	  LABS:	 	                          11.9   3.91  )-----------( 221      ( 22 Sep 2024 03:30 )             35.3     09-22    140  |  98  |  26[H]  ----------------------------<  92  4.1   |  31  |  1.16    Ca    9.1      22 Sep 2024 03:30  Phos  4.2     09-22  Mg     1.70     09-22      proBNP:   Lipid Profile:   HgA1c:   TSH:       A/P) She is a pleasant 73 y/o female PMH hypertension admitted with newly diagnosed severe LV dysfunction (LVEF 10-15%) resulting in severe functional MR. Cath pending. EP is now called for brief episodes of NSVT. She denies syncope nor cardiac arrest.    -switch to po lasix today  -continue losartan, toprol and aldactone for chronic systolic CHF  -get cath  -repeat echo after 3 months of GDMT to determine candidacy for a primary prevention ICD  -no need for LifeVest  -treat NSVT with escalating doses of toprol      Radha Sotelo M.D., [facility]  Cardiac Electrophysiology  [facility]  [address] [place] [address], [address] [place] [address]  [email or web address]    office [phone]  pager [phone]
[facility] NEPHROLOGY- CONSULTATION NOTE    74y Female with history of below presents with SOB. Nephrology consulted for volume overload.    Patient denies any prior history of CHF or diuretic use at home. Patient reports increasing SOB and LE edema over 3 days. Patient admits to polydipsia as advised for renal protection. Patient denies any history of renal disease, proteinuria or foamy urine.    REVIEW OF SYSTEMS:  Gen: no fevers  HEENT: no rhinorrhea  Neck: no sore throat  Cards: no chest pain  Resp: + dyspnea  GI: no nausea or vomiting or diarrhea  : no dysuria or hematuria  Vascular: + LE edema  Derm: no rashes  Neuro: no numbness/tingling    No Known Allergies      Home Medications Reviewed  Hospital Medications:   MEDICATIONS  (STANDING):  enoxaparin Injectable 40 milliGRAM(s) SubCutaneous every 24 hours  furosemide   Injectable 40 milliGRAM(s) IV Push two times a day  losartan 25 milliGRAM(s) Oral daily      PAST MEDICAL & SURGICAL HISTORY:  HTN (hypertension)          FAMILY HISTORY:  N/C    SOCIAL HISTORY:  Denies toxic substance use     VITALS:  T(F): 98.1 (09-19-24 @ 16:03), Max: 99.1 (09-18-24 @ 21:43)  HR: 88 (09-19-24 @ 16:03)  BP: 116/64 (09-19-24 @ 16:03)  RR: 20 (09-19-24 @ 16:03)  SpO2: 98% (09-19-24 @ 16:03)  Wt(kg): --        PHYSICAL EXAM:  Gen: NAD, calm  HEENT: MMM  Neck: no JVD  Cards: RRR, +S1/S2, no M/G/R  Resp: Bibasilar rales  GI: soft, NT/ND, NABS  : no CVA tenderness  Vascular: 2+ RLE > 1+ LLE edema  Derm: no rashes  Neuro: non-focal    LABS:  09-18    138  |  102  |  22  ----------------------------<  110[H]  4.6   |  22  |  1.16    Ca    8.5      18 Sep 2024 11:34  Phos  3.6     09-19  Mg     2.00     09-19    TPro  7.0  /  Alb  3.6  /  TBili  0.7  /  DBili      /  AST  45[H]  /  ALT  40[H]  /  AlkPhos  77  09-18    Creatinine Trend: 1.16 <--                        10.3   5.39  )-----------( 178      ( 19 Sep 2024 04:49 )             30.9     Urine Studies:  Urinalysis Basic - ( 18 Sep 2024 11:34 )    Color:  / Appearance:  / SG:  / pH:   Gluc: 110 mg/dL / Ketone:   / Bili:  / Urobili:    Blood:  / Protein:  / Nitrite:    Leuk Esterase:  / RBC:  / WBC    Sq Epi:  / Non Sq Epi:  / Bacteria:           RADIOLOGY & ADDITIONAL STUDIES:    < from: CT Chest No Cont (09.18.24 @ 17:50) >    IMPRESSION:  No evidence of pneumonia.    Moderate right and trace left pleural effusions and perihilar ground   glass opacities consistent with congestion.    ---End of Report ---    < end of copied text >      < from: Xray Chest 2 Views PA/Lat (09.18.24 @ 13:45) >  IMPRESSION: Bilateral effusions right greater than left probably   cardiogenic in origin.    --- End of Report ---    < end of copied text >      < from: US Duplex Venous Lower Ext Complete, Bilateral (09.18.24 @ 12:10) >  IMPRESSION:  No evidence of deep venous thrombosis in either lower extremity.            --- End of Report ---    < end of copied text >  
  24 @ 11:52    Patient is a 74y old  Female who presents with a chief complaint of suspected new onset CHF (20 Sep 2024 10:31)      HPI:  74-year-old female PMH of hypertension on losartan, presenting to ED with increasing shortness of breath/dyspnea on exertion/ orthopnea  over past 3 days associated with bilateral lower extremity edema.  Patient states her leg swelling normally resolves after elevating her feet however they did not resolve and worsened this morning in addition to her breathing prompting ER eval.  Patient denies any chest pain, fevers, cough, sore throat, and/V/D/C, abdominal pain, recent illness.  Patient otherwise denies any recent travel, long car rides, leg swelling, prolonged immobilization, recent surgeries, history of DVT/PE or exogenous hormone use. Duplex neg for dvt. EKG NSR with lateral twi (no prior for comparison) Trop 29, 28; pBNP  approx 18k. CXR + BL pl eff, R>L  Denies h/o cad, or prior cad workup (18 Sep 2024 17:19)    she says she came in with increasing sob at home with increasing leg edema;   she deneis having any underlying lung disease;  she never smoked;   -she is saying she is feeling better and her sob is much better         ?FOLLOWING PRESENT  [ x] Hx of PE/DVT, [ x] Hx COPD, [x ] Hx of Asthma, [y ] Hx of Hospitalization, [ x]  Hx of BiPAP/CPAP use, [ x] Hx of KORIN    Allergies    No Known Allergies    Intolerances        PAST MEDICAL & SURGICAL HISTORY:  HTN (hypertension)          FAMILY HISTORY:      Social History: [x  ] TOBACCO                  [x  ] ETOH                                 [  x] IVDA/DRUGS    REVIEW OF SYSTEMS      General:	x    Skin/Breast:x  	  Ophthalmologic:x  	  ENMT:	x    Respiratory and Thorax:  sob,  [place]ley   	  Cardiovascular:	x    Gastrointestinal:	x    Genitourinary:	x    Musculoskeletal:	 incresing leg edema    Neurological:	x  x  Psychiatric:	    Hematology/Lymphatics:	x    Endocrine:	x    Allergic/Immunologic:	x    MEDICATIONS  (STANDING):  enoxaparin Injectable 40 milliGRAM(s) SubCutaneous every 24 hours  furosemide   Injectable 40 milliGRAM(s) IV Push two times a day  losartan 25 milliGRAM(s) Oral daily    MEDICATIONS  (PRN):  acetaminophen     Tablet .. 650 milliGRAM(s) Oral every 6 hours PRN Temp greater or equal to 38C (100.4F), Mild Pain (1 - 3), Moderate Pain (4 - 6)       Vital Signs Last 24 Hrs  T(C): 36.8 (20 Sep 2024 06:20), Max: 36.8 (19 Sep 2024 18:20)  T(F): 98.2 (20 Sep 2024 06:20), Max: 98.2 (19 Sep 2024 18:20)  HR: 88 (20 Sep 2024 06:20) (88 - 106)  BP: 122/88 (20 Sep 2024 06:20) (116/64 - 143/93)  BP(mean): --  RR: 18 (20 Sep 2024 06:20) (17 - 20)  SpO2: 100% (20 Sep 2024 06:20) (98% - 100%)    Parameters below as of 20 Sep 2024 06:20  Patient On (Oxygen Delivery Method): room air    Orthostatic VS          I&O's Summary    19 Sep 2024 07:01  -  20 Sep 2024 07:00  --------------------------------------------------------  IN: 800 mL / OUT: 1500 mL / NET: -700 mL    20 Sep 2024 07:01  -  20 Sep 2024 11:52  --------------------------------------------------------  IN: 236 mL / OUT: 0 mL / NET: 236 mL        Physical Exam:   GENERAL: NAD, well-groomed, well-developed  HEENT: JEFFREY/   Atraumatic, Normocephalic  ENMT: No tonsillar erythema, exudates, or enlargement; Moist mucous membranes, Good dentition, No lesions  NECK: Supple, No JVD, Normal thyroid  CHEST/LUNG: scattered cracekls+  CVS: Regular rate and rhythm; No murmurs, rubs, or gallops  GI: : Soft, Nontender, Nondistended; Bowel sounds present  ext;  + leg edema  LYMPH: No lymphadenopathy noted  SKIN: No rashes or lesions  ENDOCRINOLOGY: No Thyromegaly  PSYCH: Appropriate    Labs:                              10.3   4.77  )-----------( 182      ( 20 Sep 2024 04:20 )             31.0                         10.3   5.39  )-----------( 178      ( 19 Sep 2024 04:49 )             30.9                         10.5   4.78  )-----------( 181      ( 18 Sep 2024 11:34 )             31.0     0920    141  |  101  |  25[H]  ----------------------------<  91  3.9   |  26  |  1.30  18    138  |  102  |  22  ----------------------------<  110[H]  4.6   |  22  |  1.16    Ca    8.7      20 Sep 2024 04:20  Phos  4.4       Phos  3.6       Mg     1.80       Mg     2.00         TPro  7.0  /  Alb  3.6  /  TBili  0.7  /  DBili  x   /  AST  45[H]  /  ALT  40[H]  /  AlkPhos  77  18    CAPILLARY BLOOD GLUCOSE          PT/INR - ( 19 Sep 2024 04:49 )   PT: 13.8 sec;   INR: 1.24 ratio         PTT - ( 19 Sep 2024 04:49 )  PTT:28.1 sec  Urinalysis Basic - ( 20 Sep 2024 09:58 )    Color: Yellow / Appearance: Clear / S.006 / pH: x  Gluc: x / Ketone: Negative mg/dL  / Bili: Negative / Urobili: 0.2 mg/dL   Blood: x / Protein: Negative mg/dL / Nitrite: Negative   Leuk Esterase: Negative / RBC: 0 /HPF / WBC 1 /HPF   Sq Epi: x / Non Sq Epi: 0 /HPF / Bacteria: Negative /HPF      D DImer      Studies  Chest X-RAY  CT SCAN Chest   CT Abdomen  Venous Dopplers: LE:   Others    < from: CT Chest No Cont (24 @ 17:50) >    AIRWAYS/LUNGS/PLEURA: No endobronchial lesion. Bilateral perihilar   groundglass opacities. Moderate right and trace left pleural effusions   and passive atelectasis.    UPPER ABDOMEN: Unremarkable.    BONES/SOFT TISSUES: Degenerative changes.    IMPRESSION:  No evidence of pneumonia.    Moderate right and trace left pleural effusions and perihilar ground   glass opacities consistent with congestion.    ---End of Report ---          PRERNA PRIEST MD; Resident Radiologist  This document has been electronically signed.  BERNA JAY MD; Attending Radiologist  This document has been electronically signed. Sep 18 2024  6:12PM    < end of copied text >                
HPI:  74-year-old female PMH of hypertension on losartan, presenting to ED with increasing shortness of breath/dyspnea on exertion/ orthopnea  over past 3 days associated with bilateral lower extremity edema.  Patient states her leg swelling normally resolves after elevating her feet however they did not resolve and worsened this morning in addition to her breathing prompting ER eval.  Patient denies any chest pain, fevers, cough, sore throat, and/V/D/C, abdominal pain, recent illness.  Patient otherwise denies any recent travel, long car rides, leg swelling, prolonged immobilization, recent surgeries, history of DVT/PE or exogenous hormone use. Duplex neg for dvt. EKG NSR with lateral twi (no prior for comparison) Trop 29, 28; pBNP  approx 18k. CXR + BL pl eff, R>L  Denies h/o cad, or prior cad workup (18 Sep 2024 17:19)      PAST MEDICAL & SURGICAL HISTORY:  HTN (hypertension)          Review of Systems:   CONSTITUTIONAL: No fever, weight loss, or fatigue  EYES: No eye pain, visual disturbances, or discharge  ENMT:  No difficulty hearing, tinnitus, vertigo; No sinus or throat pain  NECK: No pain or stiffness  BREASTS: No pain, masses, or nipple discharge  RESPIRATORY: No cough, wheezing, chills or hemoptysis; No shortness of breath  CARDIOVASCULAR: No chest pain, palpitations, dizziness, or leg swelling  GASTROINTESTINAL: No abdominal or epigastric pain. No nausea, vomiting, or hematemesis; No diarrhea or constipation. No melena or hematochezia.  GENITOURINARY: No dysuria, frequency, hematuria, or incontinence  NEUROLOGICAL: No headaches, memory loss, loss of strength, numbness, or tremors  SKIN: No itching, burning, rashes, or lesions   LYMPH NODES: No enlarged glands  ENDOCRINE: No heat or cold intolerance; No hair loss  MUSCULOSKELETAL: No joint pain or swelling; No muscle, back, or extremity pain  PSYCHIATRIC: No depression, anxiety, mood swings, or difficulty sleeping  HEME/LYMPH: No easy bruising, or bleeding gums  ALLERY AND IMMUNOLOGIC: No hives or eczema    Allergies    No Known Allergies    Intolerances        Social History:     FAMILY HISTORY:      MEDICATIONS  (STANDING):  enoxaparin Injectable 40 milliGRAM(s) SubCutaneous every 24 hours  furosemide   Injectable 40 milliGRAM(s) IV Push two times a day    MEDICATIONS  (PRN):  acetaminophen     Tablet .. 650 milliGRAM(s) Oral every 6 hours PRN Temp greater or equal to 38C (100.4F), Mild Pain (1 - 3), Moderate Pain (4 - 6)        CAPILLARY BLOOD GLUCOSE        I&O's Summary    19 Sep 2024 07:01  -  20 Sep 2024 07:00  --------------------------------------------------------  IN: 800 mL / OUT: 1500 mL / NET: -700 mL    20 Sep 2024 07:01  -  21 Sep 2024 00:03  --------------------------------------------------------  IN: 708 mL / OUT: 1000 mL / NET: -292 mL        PHYSICAL EXAM:  GENERAL: NAD, well-developed  HEAD:  Atraumatic, Normocephalic  EYES: EOMI, PERRLA, conjunctiva and sclera clear  NECK: Supple, No JVD  CHEST/LUNG: Clear to auscultation bilaterally; No wheeze  HEART: Regular rate and rhythm; No murmurs, rubs, or gallops  ABDOMEN: Soft, Nontender, Nondistended; Bowel sounds present  EXTREMITIES:  2+ Peripheral Pulses, No clubbing, cyanosis, or edema  PSYCH: AAOx3  NEUROLOGY: non-focal  SKIN: No rashes or lesions    LABS:                        10.3   4.77  )-----------( 182      ( 20 Sep 2024 04:20 )             31.0     09-20    141  |  101  |  25[H]  ----------------------------<  91  3.9   |  26  |  1.30    Ca    8.7      20 Sep 2024 04:20  Phos  4.4     09-20  Mg     1.80     09-20      PT/INR - ( 19 Sep 2024 04:49 )   PT: 13.8 sec;   INR: 1.24 ratio         PTT - ( 19 Sep 2024 04:49 )  PTT:28.1 sec      Urinalysis Basic - ( 20 Sep 2024 09:58 )    Color: Yellow / Appearance: Clear / S.006 / pH: x  Gluc: x / Ketone: Negative mg/dL  / Bili: Negative / Urobili: 0.2 mg/dL   Blood: x / Protein: Negative mg/dL / Nitrite: Negative   Leuk Esterase: Negative / RBC: 0 /HPF / WBC 1 /HPF   Sq Epi: x / Non Sq Epi: 0 /HPF / Bacteria: Negative /HPF        RADIOLOGY & ADDITIONAL TESTS:    Imaging Personally Reviewed:    Consultant(s) Notes Reviewed:      Care Discussed with Consultants/Other Providers:

## 2024-09-23 VITALS
OXYGEN SATURATION: 99 % | RESPIRATION RATE: 18 BRPM | SYSTOLIC BLOOD PRESSURE: 114 MMHG | TEMPERATURE: 98 F | DIASTOLIC BLOOD PRESSURE: 67 MMHG | HEART RATE: 90 BPM

## 2024-09-23 LAB
ANION GAP SERPL CALC-SCNC: 14 MMOL/L — SIGNIFICANT CHANGE UP (ref 7–14)
BUN SERPL-MCNC: 25 MG/DL — HIGH (ref 7–23)
CALCIUM SERPL-MCNC: 8.8 MG/DL — SIGNIFICANT CHANGE UP (ref 8.4–10.5)
CHLORIDE SERPL-SCNC: 99 MMOL/L — SIGNIFICANT CHANGE UP (ref 98–107)
CO2 SERPL-SCNC: 27 MMOL/L — SIGNIFICANT CHANGE UP (ref 22–31)
CREAT SERPL-MCNC: 1.11 MG/DL — SIGNIFICANT CHANGE UP (ref 0.5–1.3)
EGFR: 52 ML/MIN/1.73M2 — LOW
GLUCOSE SERPL-MCNC: 98 MG/DL — SIGNIFICANT CHANGE UP (ref 70–99)
HCT VFR BLD CALC: 33.3 % — LOW (ref 34.5–45)
HGB BLD-MCNC: 11.3 G/DL — LOW (ref 11.5–15.5)
MAGNESIUM SERPL-MCNC: 1.8 MG/DL — SIGNIFICANT CHANGE UP (ref 1.6–2.6)
MCHC RBC-ENTMCNC: 29.2 PG — SIGNIFICANT CHANGE UP (ref 27–34)
MCHC RBC-ENTMCNC: 33.9 GM/DL — SIGNIFICANT CHANGE UP (ref 32–36)
MCV RBC AUTO: 86 FL — SIGNIFICANT CHANGE UP (ref 80–100)
NRBC # BLD: 0 /100 WBCS — SIGNIFICANT CHANGE UP (ref 0–0)
NRBC # FLD: 0 K/UL — SIGNIFICANT CHANGE UP (ref 0–0)
PHOSPHATE SERPL-MCNC: 3.7 MG/DL — SIGNIFICANT CHANGE UP (ref 2.5–4.5)
PLATELET # BLD AUTO: 227 K/UL — SIGNIFICANT CHANGE UP (ref 150–400)
POTASSIUM SERPL-MCNC: 3.9 MMOL/L — SIGNIFICANT CHANGE UP (ref 3.5–5.3)
POTASSIUM SERPL-SCNC: 3.9 MMOL/L — SIGNIFICANT CHANGE UP (ref 3.5–5.3)
RBC # BLD: 3.87 M/UL — SIGNIFICANT CHANGE UP (ref 3.8–5.2)
RBC # FLD: 17.3 % — HIGH (ref 10.3–14.5)
SODIUM SERPL-SCNC: 140 MMOL/L — SIGNIFICANT CHANGE UP (ref 135–145)
WBC # BLD: 3.92 K/UL — SIGNIFICANT CHANGE UP (ref 3.8–10.5)
WBC # FLD AUTO: 3.92 K/UL — SIGNIFICANT CHANGE UP (ref 3.8–10.5)

## 2024-09-23 RX ORDER — LOSARTAN POTASSIUM 100 MG/1
1 TABLET, FILM COATED ORAL
Qty: 30 | Refills: 0
Start: 2024-09-23 | End: 2024-10-22

## 2024-09-23 RX ORDER — METOPROLOL TARTRATE 50 MG
50 TABLET ORAL DAILY
Refills: 0 | Status: DISCONTINUED | OUTPATIENT
Start: 2024-09-23 | End: 2024-09-23

## 2024-09-23 RX ORDER — ASPIRIN 325 MG
1 TABLET ORAL
Qty: 30 | Refills: 0
Start: 2024-09-23 | End: 2024-10-22

## 2024-09-23 RX ORDER — LOSARTAN POTASSIUM 100 MG/1
50 TABLET, FILM COATED ORAL DAILY
Refills: 0 | Status: DISCONTINUED | OUTPATIENT
Start: 2024-09-23 | End: 2024-09-23

## 2024-09-23 RX ORDER — LOSARTAN POTASSIUM 100 MG/1
1 TABLET, FILM COATED ORAL
Refills: 0 | DISCHARGE

## 2024-09-23 RX ORDER — METOPROLOL TARTRATE 50 MG
1 TABLET ORAL
Qty: 30 | Refills: 0
Start: 2024-09-23 | End: 2024-10-22

## 2024-09-23 RX ORDER — FUROSEMIDE 10 MG/ML
1 INJECTION INTRAVENOUS
Qty: 30 | Refills: 0
Start: 2024-09-23 | End: 2024-10-22

## 2024-09-23 RX ORDER — FUROSEMIDE 10 MG/ML
40 INJECTION INTRAVENOUS DAILY
Refills: 0 | Status: DISCONTINUED | OUTPATIENT
Start: 2024-09-23 | End: 2024-09-23

## 2024-09-23 RX ORDER — SPIRONOLACTONE 100 MG/1
1 TABLET, FILM COATED ORAL
Qty: 30 | Refills: 0
Start: 2024-09-23 | End: 2024-10-22

## 2024-09-23 RX ADMIN — FUROSEMIDE 80 MILLIGRAM(S): 10 INJECTION INTRAVENOUS at 05:53

## 2024-09-23 RX ADMIN — Medication 25 MILLIGRAM(S): at 05:53

## 2024-09-23 RX ADMIN — Medication 5000 UNIT(S): at 05:53

## 2024-09-23 RX ADMIN — SPIRONOLACTONE 25 MILLIGRAM(S): 100 TABLET, FILM COATED ORAL at 05:53

## 2024-09-23 RX ADMIN — Medication 5000 UNIT(S): at 14:35

## 2024-09-23 NOTE — PROGRESS NOTE ADULT - REASON FOR ADMISSION
suspected new onset CHF

## 2024-09-23 NOTE — DISCHARGE NOTE PROVIDER - NSDCCPCAREPLAN_GEN_ALL_CORE_FT
PRINCIPAL DISCHARGE DIAGNOSIS  Diagnosis: Shortness of breath  Assessment and Plan of Treatment: You were found to have Systolic CHF- Congestive Heart Failure, please take your medications as prescribed. Follow up with your PCP & Cardiologist for further outpatient management. You have an appointment with Dr Grullon on 9/30 at 2:30pm.

## 2024-09-23 NOTE — PROGRESS NOTE ADULT - SUBJECTIVE AND OBJECTIVE BOX
DATE OF SERVICE: 09-23-24    Patient denies chest pain or shortness of breath.   Review of symptoms otherwise negative.    MEDICATIONS:  acetaminophen     Tablet .. 650 milliGRAM(s) Oral every 6 hours PRN  furosemide    Tablet 80 milliGRAM(s) Oral daily  heparin   Injectable 5000 Unit(s) SubCutaneous every 8 hours  losartan 25 milliGRAM(s) Oral at bedtime  metoprolol succinate ER 25 milliGRAM(s) Oral daily  spironolactone 25 milliGRAM(s) Oral daily      LABS:                        11.3   3.92  )-----------( 227      ( 23 Sep 2024 04:38 )             33.3       Hemoglobin: 11.3 g/dL (09-23 @ 04:38)  Hemoglobin: 11.9 g/dL (09-22 @ 03:30)  Hemoglobin: 10.6 g/dL (09-21 @ 05:21)  Hemoglobin: 10.3 g/dL (09-20 @ 04:20)  Hemoglobin: 10.3 g/dL (09-19 @ 04:49)      09-23    140  |  99  |  25[H]  ----------------------------<  98  3.9   |  27  |  1.11    Ca    8.8      23 Sep 2024 04:38  Phos  3.7     09-23  Mg     1.80     09-23    Creatinine Trend: 1.11<--, 1.16<--, 1.17<--, 1.30<--, 1.16<--    PHYSICAL EXAM:  T(C): 36.7 (09-23-24 @ 05:50), Max: 36.8 (09-22-24 @ 18:02)  HR: 99 (09-23-24 @ 05:50) (94 - 99)  BP: 130/85 (09-23-24 @ 05:50) (115/62 - 130/85)  RR: 16 (09-23-24 @ 05:50) (16 - 18)  SpO2: 95% (09-23-24 @ 05:50) (95% - 100%)  Wt(kg): --    I&O's Summary    22 Sep 2024 07:01  -  23 Sep 2024 07:00  --------------------------------------------------------  IN: 712 mL / OUT: 0 mL / NET: 712 mL    23 Sep 2024 07:01  -  23 Sep 2024 10:42  --------------------------------------------------------  IN: 240 mL / OUT: 0 mL / NET: 240 mL    General:  Alert and Oriented * 3.   Head: Normocephalic and atraumatic.   Neck: + JVD. No bruits. Supple. Does not appear to be enlarged.   Cardiovascular: + S1,S2 ; RRR Soft systolic murmur at the left lower sternal border. No rubs noted.    Lungs: decreased sounds at bases  Abdomen: + BS, soft. Non tender. Non distended. No rebound. No guarding.   Extremities: +1 edema   Neurologic: Moves all four extremities. Full range of motion.   Skin: Warm and moist. The patient's skin has normal elasticity and good skin turgor.   Psychiatric: Appropriate mood and affect.  Musculoskeletal: Normal range of motion, normal strength     ECG: NSR  	    < from: CT Chest No Cont (09.18.24 @ 17:50) >  IMPRESSION:  No evidence of pneumonia.    Moderate right and trace left pleural effusions and perihilar ground   glass opacities consistent with congestion.    Tele: NSR   < from: TTE W or WO Ultrasound Enhancing Agent (09.20.24 @ 13:13) >  CONCLUSIONS:      1. Left ventricular cavity is normalin size. Left ventricular wall thickness is normal. Left ventricular systolic function is severely decreased with an ejection fraction of 13 % by Gabriel's method of disks. Global left ventricular hypokinesis.   2. Left ventricular global longitudinal strain is -4.0 % is abnormal (> -16%). Images were acquired on a [employer] ultrasound system and processed on the ultrasound machine with a heart rate of 91 bpm and a blood pressure of 122/88 mmHg.   3. Normal right ventricular cavity size and reduced right ventricular systolic function.   4. Structurally normal mitral valve with normal leaflet excursion. There is calcification of the mitral valve annulus. There is moderate to severe mitral regurgitation.   5. The left atrium is moderately dilated with an indexed volume of 46.70 ml/m².   6. No prior echocardiogram is available for comparison.    < end of copied text >    ASSESSMENT/PLAN: 74-year-old female PMH of hypertension on losartan, presenting to ED with increasing shortness of breath/dyspnea on exertion/ orthopnea  over past 3 days associated with bilateral lower extremity edema.     Acute HFrEF  -- echo noted - severe LV dfx with global hypokinesis  -- volume status improved will transiton to 40 mg PO daily lasix  -- c/w Losartan, BB and Aldactone, increase BB as tolerated for NSVT, increase Losartan to 50 mg and increase Bb to 50 mg  -- Patient does not want inpatient cath, offered  but she refused, states she wants to go home and wants to shower, she understands the risk of incomplete work-up for reduced EF including arrythmia and death, she prefers to follow up in our office and have outpatient  work-up    Follow up 09/30/2024 at [facility] with Dr. Grullon at 2:30 PM    Damian Ching MD  Pager: [phone]     
Date of Service: 09-21-24 @ 10:43    Patient is a 74y old  Female who presents with a chief complaint of suspected new onset CHF (21 Sep 2024 09:01)      Any change in ROS: seems better;   on room air;  she says her breathing is much better     MEDICATIONS  (STANDING):  enoxaparin Injectable 40 milliGRAM(s) SubCutaneous every 24 hours  furosemide   Injectable 40 milliGRAM(s) IV Push two times a day  losartan 25 milliGRAM(s) Oral at bedtime  metoprolol succinate ER 25 milliGRAM(s) Oral daily  spironolactone 25 milliGRAM(s) Oral daily    MEDICATIONS  (PRN):  acetaminophen     Tablet .. 650 milliGRAM(s) Oral every 6 hours PRN Temp greater or equal to 38C (100.4F), Mild Pain (1 - 3), Moderate Pain (4 - 6)    Vital Signs Last 24 Hrs  T(C): 37 (21 Sep 2024 08:25), Max: 37 (21 Sep 2024 08:25)  T(F): 98.6 (21 Sep 2024 08:25), Max: 98.6 (21 Sep 2024 08:25)  HR: 100 (21 Sep 2024 08:25) (72 - 100)  BP: 119/95 (21 Sep 2024 08:25) (94/67 - 130/84)  BP(mean): --  RR: 18 (21 Sep 2024 08:25) (17 - 18)  SpO2: 100% (21 Sep 2024 08:25) (98% - 100%)    Parameters below as of 21 Sep 2024 08:25  Patient On (Oxygen Delivery Method): room air        I&O's Summary    20 Sep 2024 07:01  -  21 Sep 2024 07:00  --------------------------------------------------------  IN: 708 mL / OUT: 2000 mL / NET: -1292 mL    21 Sep 2024 07:01  -  21 Sep 2024 10:43  --------------------------------------------------------  IN: 236 mL / OUT: 0 mL / NET: 236 mL          Physical Exam:   GENERAL: NAD, well-groomed, well-developed  HEENT: JEFFREY/   Atraumatic, Normocephalic  ENMT: No tonsillar erythema, exudates, or enlargement; Moist mucous membranes, Good dentition, No lesions  NECK: Supple, No JVD, Normal thyroid  CHEST/LUNG: Clear to auscultaion  CVS: Regular rate and rhythm; No murmurs, rubs, or gallops  GI: : Soft, Nontender, Nondistended; Bowel sounds present  NERVOUS SYSTEM:  Alert & Oriented X3  EXTREMITIES:  mild  edema  LYMPH: No lymphadenopathy noted  SKIN: No rashes or lesions  ENDOCRINOLOGY: No Thyromegaly  PSYCH: Appropriate    Labs:                              10.6   4.91  )-----------( 200      ( 21 Sep 2024 05:21 )             31.4                         10.3   4.77  )-----------( 182      ( 20 Sep 2024 04:20 )             31.0                         10.3   5.39  )-----------( 178      ( 19 Sep 2024 04:49 )             30.9                         10.5   4.78  )-----------( 181      ( 18 Sep 2024 11:34 )             31.0     09-21    141  |  101  |  26[H]  ----------------------------<  103[H]  3.7   |  26  |  1.17  09-20    141  |  101  |  25[H]  ----------------------------<  91  3.9   |  26  |  1.30  09-18    138  |  102  |  22  ----------------------------<  110[H]  4.6   |  22  |  1.16    Ca    8.8      21 Sep 2024 05:21  Ca    8.7      20 Sep 2024 04:20  Phos  4.8     09-21  Phos  4.4     09-20  Mg     1.70     09-21  Mg     1.80     09-20    TPro  7.0  /  Alb  3.6  /  TBili  0.7  /  DBili  x   /  AST  45[H]  /  ALT  40[H]  /  AlkPhos  77  09-18    CAPILLARY BLOOD GLUCOSE              Urinalysis Basic - ( 21 Sep 2024 05:21 )    Color: x / Appearance: x / SG: x / pH: x  Gluc: 103 mg/dL / Ketone: x  / Bili: x / Urobili: x   Blood: x / Protein: x / Nitrite: x   Leuk Esterase: x / RBC: x / WBC x   Sq Epi: x / Non Sq Epi: x / Bacteria: x    rd< from: CT Chest No Cont (09.18.24 @ 17:50) >  aorta is normal in caliber.  There are aortic calcifications.    AIRWAYS/LUNGS/PLEURA: No endobronchial lesion. Bilateral perihilar   groundglass opacities. Moderate right and trace left pleural effusions   and passive atelectasis.    UPPER ABDOMEN: Unremarkable.    BONES/SOFT TISSUES: Degenerative changes.    IMPRESSION:  No evidence of pneumonia.    Moderate right and trace left pleural effusions and perihilar ground   glass opacities consistent with congestion.    ---End of Report ---          PRERNA PRIEST MD; Resident Radiologist  This document has been electronically signed.  BERNA JAY MD; Attending Radiologist  This document has been electronically signed. Sep 18 2024  6:12PM    < end of copied text >          RECENT CULTURES:        RESPIRATORY CULTURES:          Studies  Chest X-RAY  CT SCAN Chest   Venous Dopplers: LE:   CT Abdomen  Others              
Patient is a 74y old  Female who presents with a chief complaint of suspected new onset CHF (22 Sep 2024 14:18)      SUBJECTIVE / OVERNIGHT EVENTS:    Events noted.  CONSTITUTIONAL: No fever,  or fatigue  RESPIRATORY: No cough, wheezing,  No shortness of breath  CARDIOVASCULAR: No chest pain, palpitations, dizziness, or leg swelling  GASTROINTESTINAL: No abdominal or epigastric pain. No nausea, vomiting.  NEUROLOGICAL: No headache    MEDICATIONS  (STANDING):  furosemide    Tablet 80 milliGRAM(s) Oral daily  heparin   Injectable 5000 Unit(s) SubCutaneous every 8 hours  losartan 25 milliGRAM(s) Oral at bedtime  metoprolol succinate ER 25 milliGRAM(s) Oral daily  spironolactone 25 milliGRAM(s) Oral daily    MEDICATIONS  (PRN):  acetaminophen     Tablet .. 650 milliGRAM(s) Oral every 6 hours PRN Temp greater or equal to 38C (100.4F), Mild Pain (1 - 3), Moderate Pain (4 - 6)        CAPILLARY BLOOD GLUCOSE        I&O's Summary    21 Sep 2024 07:01  -  22 Sep 2024 07:00  --------------------------------------------------------  IN: 708 mL / OUT: 3350 mL / NET: -2642 mL    22 Sep 2024 07:01  -  22 Sep 2024 16:58  --------------------------------------------------------  IN: 472 mL / OUT: 0 mL / NET: 472 mL        T(C): 36.3 (09-22-24 @ 12:30), Max: 36.6 (09-21-24 @ 21:21)  HR: 94 (09-22-24 @ 12:30) (85 - 105)  BP: 119/79 (09-22-24 @ 12:30) (117/73 - 129/78)  RR: 17 (09-22-24 @ 12:30) (16 - 17)  SpO2: 98% (09-22-24 @ 12:30) (98% - 100%)    PHYSICAL EXAM:    NECK: Supple, No JVD  CHEST/LUNG: Clear to auscultation bilaterally; No wheezing.  HEART: Regular rate and rhythm; No murmurs, rubs, or gallops  ABDOMEN: Soft, Nontender, Nondistended; Bowel sounds present  EXTREMITIES:   No edema  NEUROLOGY: AAO X 3      LABS:                        11.9   3.91  )-----------( 221      ( 22 Sep 2024 03:30 )             35.3     09-22    140  |  98  |  26[H]  ----------------------------<  92  4.1   |  31  |  1.16    Ca    9.1      22 Sep 2024 03:30  Phos  4.2     09-22  Mg     1.70     09-22            Urinalysis Basic - ( 22 Sep 2024 03:30 )    Color: x / Appearance: x / SG: x / pH: x  Gluc: 92 mg/dL / Ketone: x  / Bili: x / Urobili: x   Blood: x / Protein: x / Nitrite: x   Leuk Esterase: x / RBC: x / WBC x   Sq Epi: x / Non Sq Epi: x / Bacteria: x      CAPILLARY BLOOD GLUCOSE            RADIOLOGY & ADDITIONAL TESTS:    Imaging Personally Reviewed:    Consultant(s) Notes Reviewed:      Care Discussed with Consultants/Other Providers:    Aroldo Guillory MD, CMD, FACP    [address] [place] [address]  Office Tel: [phone]  Cell: [phone]  
[facility] NEPHROLOGY- PROGRESS NOTE    74y Female with history of HTN presents with SOB. Nephrology consulted for volume overload.    REVIEW OF SYSTEMS:  Gen: no fevers  Cards: no chest pain  Resp: no dyspnea  GI: no nausea or vomiting or diarrhea  Vascular: no LE edema    No Known Allergies      Hospital Medications: Medications reviewed      VITALS:  T(F): 98 (09-23-24 @ 05:50), Max: 98.3 (09-22-24 @ 18:02)  HR: 99 (09-23-24 @ 05:50)  BP: 130/85 (09-23-24 @ 05:50)  RR: 16 (09-23-24 @ 05:50)  SpO2: 95% (09-23-24 @ 05:50)  Wt(kg): --    09-22 @ 07:01  -  09-23 @ 07:00  --------------------------------------------------------  IN: 712 mL / OUT: 0 mL / NET: 712 mL    09-23 @ 07:01  -  09-23 @ 12:07  --------------------------------------------------------  IN: 240 mL / OUT: 0 mL / NET: 240 mL        PHYSICAL EXAM:    Gen: NAD, calm  Cards: RRR, +S1/S2, no M/G/R  Resp: CTA B/L  GI: soft, NT/ND, NABS  Vascular: no LE edema B/L      LABS:  09-23    140  |  99  |  25[H]  ----------------------------<  98  3.9   |  27  |  1.11    Ca    8.8      23 Sep 2024 04:38  Phos  3.7     09-23  Mg     1.80     09-23      Creatinine Trend: 1.11 <--, 1.16 <--, 1.17 <--, 1.30 <--, 1.16 <--                        11.3   3.92  )-----------( 227      ( 23 Sep 2024 04:38 )             33.3     Urine Studies:  Urinalysis Basic - ( 23 Sep 2024 04:38 )    Color:  / Appearance:  / SG:  / pH:   Gluc: 98 mg/dL / Ketone:   / Bili:  / Urobili:    Blood:  / Protein:  / Nitrite:    Leuk Esterase:  / RBC:  / WBC    Sq Epi:  / Non Sq Epi:  / Bacteria:       Sodium, Random Urine: 115 mmol/L (09-20 @ 09:58)  Creatinine, Random Urine: 12 mg/dL (09-20 @ 09:58)  Protein/Creatinine Ratio Calculation: Unable to Calc Ratio (09-20 @ 09:58)      
DATE OF SERVICE: 09-20-24    Patient denies chest pain, states SOB has improved.   Review of symptoms otherwise negative.    MEDICATIONS:  acetaminophen     Tablet .. 650 milliGRAM(s) Oral every 6 hours PRN  enoxaparin Injectable 40 milliGRAM(s) SubCutaneous every 24 hours  furosemide   Injectable 40 milliGRAM(s) IV Push two times a day  losartan 25 milliGRAM(s) Oral daily      LABS:                        10.3   4.77  )-----------( 182      ( 20 Sep 2024 04:20 )             31.0       Hemoglobin: 10.3 g/dL (09-20 @ 04:20)  Hemoglobin: 10.3 g/dL (09-19 @ 04:49)  Hemoglobin: 10.5 g/dL (09-18 @ 11:34)      09-20    141  |  101  |  25[H]  ----------------------------<  91  3.9   |  26  |  1.30    Ca    8.7      20 Sep 2024 04:20  Phos  4.4     09-20  Mg     1.80     09-20    TPro  7.0  /  Alb  3.6  /  TBili  0.7  /  DBili  x   /  AST  45[H]  /  ALT  40[H]  /  AlkPhos  77  09-18    Creatinine Trend: 1.30<--, 1.16<--    COAGS:           PHYSICAL EXAM:  T(C): 36.8 (09-20-24 @ 06:20), Max: 36.8 (09-19-24 @ 18:20)  HR: 88 (09-20-24 @ 06:20) (88 - 106)  BP: 122/88 (09-20-24 @ 06:20) (116/64 - 143/93)  RR: 18 (09-20-24 @ 06:20) (17 - 20)  SpO2: 100% (09-20-24 @ 06:20) (98% - 100%)  Wt(kg): --    I&O's Summary    19 Sep 2024 07:01  -  20 Sep 2024 07:00  --------------------------------------------------------  IN: 800 mL / OUT: 1500 mL / NET: -700 mL    General:  Alert and Oriented * 3.   Head: Normocephalic and atraumatic.   Neck: + JVD. No bruits. Supple. Does not appear to be enlarged.   Cardiovascular: + S1,S2 ; RRR Soft systolic murmur at the left lower sternal border. No rubs noted.    Lungs: decreased sounds at bases  Abdomen: + BS, soft. Non tender. Non distended. No rebound. No guarding.   Extremities: +1 edema   Neurologic: Moves all four extremities. Full range of motion.   Skin: Warm and moist. The patient's skin has normal elasticity and good skin turgor.   Psychiatric: Appropriate mood and affect.  Musculoskeletal: Normal range of motion, normal strength     ECG: NSR  	    < from: CT Chest No Cont (09.18.24 @ 17:50) >  IMPRESSION:  No evidence of pneumonia.    Moderate right and trace left pleural effusions and perihilar ground   glass opacities consistent with congestion.    Tele: NSVT    ASSESSMENT/PLAN: 74-year-old female PMH of hypertension on losartan, presenting to ED with increasing shortness of breath/dyspnea on exertion/ orthopnea  over past 3 days associated with bilateral lower extremity edema.     Acute CHF  --check TTE for EF  --c/w Lasix 40 IV BID  --Cont Losartan for HTN  --Pulm eval for Pleural effusion  --Medicine consult  --ischemic eval pending above/ when euvolemic  Damian Ching MD  Pager: [phone]   
DATE OF SERVICE: 09-21-24      no complaints this am , pt resting in bed        acetaminophen     Tablet .. 650 milliGRAM(s) Oral every 6 hours PRN  enoxaparin Injectable 40 milliGRAM(s) SubCutaneous every 24 hours  furosemide   Injectable 40 milliGRAM(s) IV Push two times a day  losartan 25 milliGRAM(s) Oral at bedtime                            10.6   4.91  )-----------( 200      ( 21 Sep 2024 05:21 )             31.4       Hemoglobin: 10.6 g/dL (09-21 @ 05:21)  Hemoglobin: 10.3 g/dL (09-20 @ 04:20)  Hemoglobin: 10.3 g/dL (09-19 @ 04:49)  Hemoglobin: 10.5 g/dL (09-18 @ 11:34)      09-21    141  |  101  |  26[H]  ----------------------------<  103[H]  3.7   |  26  |  1.17    Ca    8.8      21 Sep 2024 05:21  Phos  4.8     09-21  Mg     1.70     09-21      Creatinine Trend: 1.17<--, 1.30<--, 1.16<--    COAGS:           T(C): 37 (09-21-24 @ 08:25), Max: 37 (09-21-24 @ 08:25)  HR: 100 (09-21-24 @ 08:25) (72 - 100)  BP: 119/95 (09-21-24 @ 08:25) (94/67 - 130/84)  RR: 18 (09-21-24 @ 08:25) (17 - 18)  SpO2: 100% (09-21-24 @ 08:25) (98% - 100%)  Wt(kg): --    I&O's Summary    20 Sep 2024 07:01  -  21 Sep 2024 07:00  --------------------------------------------------------  IN: 708 mL / OUT: 2000 mL / NET: -1292 mL      General:  Alert and Oriented * 3.   Head: Normocephalic and atraumatic.   Neck: + JVD. No bruits. Supple. Does not appear to be enlarged.   Cardiovascular: + S1,S2 ; RRR Soft systolic murmur at the left lower sternal border. No rubs noted.    Lungs: decreased sounds at bases  Abdomen: + BS, soft. Non tender. Non distended. No rebound. No guarding.   Extremities: +1 edema   Neurologic: Moves all four extremities. Full range of motion.   Skin: Warm and moist. The patient's skin has normal elasticity and good skin turgor.   Psychiatric: Appropriate mood and affect.  Musculoskeletal: Normal range of motion, normal strength     ECG: NSR  	    < from: CT Chest No Cont (09.18.24 @ 17:50) >  IMPRESSION:  No evidence of pneumonia.    Moderate right and trace left pleural effusions and perihilar ground   glass opacities consistent with congestion.    Tele: NSR   < from: TTE W or WO Ultrasound Enhancing Agent (09.20.24 @ 13:13) >  CONCLUSIONS:      1. Left ventricular cavity is normalin size. Left ventricular wall thickness is normal. Left ventricular systolic function is severely decreased with an ejection fraction of 13 % by Gabriel's method of disks. Global left ventricular hypokinesis.   2. Left ventricular global longitudinal strain is -4.0 % is abnormal (> -16%). Images were acquired on a [employer] ultrasound system and processed on the ultrasound machine with a heart rate of 91 bpm and a blood pressure of 122/88 mmHg.   3. Normal right ventricular cavity size and reduced right ventricular systolic function.   4. Structurally normal mitral valve with normal leaflet excursion. There is calcification of the mitral valve annulus. There is moderate to severe mitral regurgitation.   5. The left atrium is moderately dilated with an indexed volume of 46.70 ml/m².   6. No prior echocardiogram is available for comparison.    < end of copied text >    ASSESSMENT/PLAN: 74-year-old female PMH of hypertension on losartan, presenting to ED with increasing shortness of breath/dyspnea on exertion/ orthopnea  over past 3 days associated with bilateral lower extremity edema.     Acute CHF  -- echo noted - severe LV dfx   -- keep net neg with Lasix 40 IV BID  --Cont Losartan for HTN  -- daily weight   -- correct lytes   --Pulm input appreciated   --ischemic eval pending above/ when euvolemic 
DATE OF SERVICE: 09-22-24      no events overnight, pt resting in bed       acetaminophen     Tablet .. 650 milliGRAM(s) Oral every 6 hours PRN  furosemide   Injectable 40 milliGRAM(s) IV Push two times a day  heparin   Injectable 5000 Unit(s) SubCutaneous every 8 hours  losartan 25 milliGRAM(s) Oral at bedtime  metoprolol succinate ER 25 milliGRAM(s) Oral daily  spironolactone 25 milliGRAM(s) Oral daily                            11.9   3.91  )-----------( 221      ( 22 Sep 2024 03:30 )             35.3       Hemoglobin: 11.9 g/dL (09-22 @ 03:30)  Hemoglobin: 10.6 g/dL (09-21 @ 05:21)  Hemoglobin: 10.3 g/dL (09-20 @ 04:20)  Hemoglobin: 10.3 g/dL (09-19 @ 04:49)  Hemoglobin: 10.5 g/dL (09-18 @ 11:34)      09-22    140  |  98  |  26[H]  ----------------------------<  92  4.1   |  31  |  1.16    Ca    9.1      22 Sep 2024 03:30  Phos  4.2     09-22  Mg     1.70     09-22      Creatinine Trend: 1.16<--, 1.17<--, 1.30<--, 1.16<--    COAGS:           T(C): 36.6 (09-22-24 @ 05:40), Max: 36.9 (09-21-24 @ 16:00)  HR: 85 (09-22-24 @ 05:40) (85 - 105)  BP: 117/73 (09-22-24 @ 05:40) (117/73 - 129/78)  RR: 16 (09-22-24 @ 05:40) (16 - 18)  SpO2: 100% (09-22-24 @ 05:40) (100% - 100%)  Wt(kg): --    I&O's Summary    21 Sep 2024 07:01  -  22 Sep 2024 07:00  --------------------------------------------------------  IN: 708 mL / OUT: 3350 mL / NET: -2642 mL    22 Sep 2024 07:01  -  22 Sep 2024 10:38  --------------------------------------------------------  IN: 236 mL / OUT: 0 mL / NET: 236 mL        General:  Alert and Oriented * 3.   Head: Normocephalic and atraumatic.   Neck: + JVD. No bruits. Supple. Does not appear to be enlarged.   Cardiovascular: + S1,S2 ; RRR Soft systolic murmur at the left lower sternal border. No rubs noted.    Lungs: decreased sounds at bases  Abdomen: + BS, soft. Non tender. Non distended. No rebound. No guarding.   Extremities: +1 edema   Neurologic: Moves all four extremities. Full range of motion.   Skin: Warm and moist. The patient's skin has normal elasticity and good skin turgor.   Psychiatric: Appropriate mood and affect.  Musculoskeletal: Normal range of motion, normal strength     ECG: NSR  	    < from: CT Chest No Cont (09.18.24 @ 17:50) >  IMPRESSION:  No evidence of pneumonia.    Moderate right and trace left pleural effusions and perihilar ground   glass opacities consistent with congestion.    Tele: NSR   < from: TTE W or WO Ultrasound Enhancing Agent (09.20.24 @ 13:13) >  CONCLUSIONS:      1. Left ventricular cavity is normalin size. Left ventricular wall thickness is normal. Left ventricular systolic function is severely decreased with an ejection fraction of 13 % by Gabriel's method of disks. Global left ventricular hypokinesis.   2. Left ventricular global longitudinal strain is -4.0 % is abnormal (> -16%). Images were acquired on a [employer] ultrasound system and processed on the ultrasound machine with a heart rate of 91 bpm and a blood pressure of 122/88 mmHg.   3. Normal right ventricular cavity size and reduced right ventricular systolic function.   4. Structurally normal mitral valve with normal leaflet excursion. There is calcification of the mitral valve annulus. There is moderate to severe mitral regurgitation.   5. The left atrium is moderately dilated with an indexed volume of 46.70 ml/m².   6. No prior echocardiogram is available for comparison.    < end of copied text >    ASSESSMENT/PLAN: 74-year-old female PMH of hypertension on losartan, presenting to ED with increasing shortness of breath/dyspnea on exertion/ orthopnea  over past 3 days associated with bilateral lower extremity edema.     Acute CHF  -- echo noted - severe LV dfx   -- keep net neg with Lasix 40 IV BID  --Cont Losartan for HTN  -- daily weight   -- correct lytes   --Pulm input appreciated   --ischemic eval pending above/ when euvolemic 
EP ATTENDING    HISTORY OF PRESENT ILLNESS: She is a pleasant 75 y/o female admitted with newly diagnosed severe LV dysfunction (LVEF 10-15%) resulting in severe functional MR. Cath pending. EP is now called for brief episodes of NSVT. She denies syncope nor cardiac arrest.  Date of service 9/23- resting in bed in no acute distress. insists upon going home, wishes to defer or simply not undergo coronary angiography, and is willing to take oral medication for severe LV systolic dysfunction. intends to refuse ICD implant in the future, refuses to undergo any invasive procedures.  no angina or palpitations today.    PAST MEDICAL & SURGICAL HISTORY:  HTN (hypertension)  new severe LV dysfunction with severe functional MR    acetaminophen     Tablet .. 650 milliGRAM(s) Oral every 6 hours PRN  furosemide    Tablet 40 milliGRAM(s) Oral daily  heparin   Injectable 5000 Unit(s) SubCutaneous every 8 hours  losartan 50 milliGRAM(s) Oral daily  metoprolol succinate ER 50 milliGRAM(s) Oral daily  spironolactone 25 milliGRAM(s) Oral daily                            11.3   3.92  )-----------( 227      ( 23 Sep 2024 04:38 )             33.3       09-23    140  |  99  |  25[H]  ----------------------------<  98  3.9   |  27  |  1.11    Ca    8.8      23 Sep 2024 04:38  Phos  3.7     09-23  Mg     1.80     09-23      T(C): 36.4 (09-23-24 @ 12:51), Max: 36.8 (09-22-24 @ 18:02)  HR: 90 (09-23-24 @ 12:51) (90 - 99)  BP: 114/67 (09-23-24 @ 12:51) (114/67 - 130/85)  RR: 18 (09-23-24 @ 12:51) (16 - 18)  SpO2: 99% (09-23-24 @ 12:51) (95% - 100%)  Wt(kg): --    I&O's Summary    22 Sep 2024 07:01  -  23 Sep 2024 07:00  --------------------------------------------------------  IN: 712 mL / OUT: 0 mL / NET: 712 mL    23 Sep 2024 07:01  -  23 Sep 2024 15:41  --------------------------------------------------------  IN: 240 mL / OUT: 0 mL / NET: 240 mL      Appearance: Normal	  HEENT:   Normal oral mucosa, PERRL, EOMI	  Lymphatic: No lymphadenopathy , no edema  Cardiovascular: Normal S1 S2, No JVD, No murmurs , Peripheral pulses palpable 2+ bilaterally  Respiratory: Lungs clear to auscultation, normal effort 	  Gastrointestinal:  Soft, Non-tender, + BS	  Skin: No rashes, No ecchymoses, No cyanosis, warm to touch  Musculoskeletal: Normal range of motion, normal strength  Psychiatry:  Mood & affect appropriate      TELEMETRY: NSR, VPCs	    ECG: NSR, VPCs  Echo:  < from: TTE W or WO Ultrasound Enhancing Agent (09.20.24 @ 13:13) >  CONCLUSIONS:      1. Left ventricular cavity is normalin size. Left ventricular wall thickness is normal. Left ventricular systolic function is severely decreased with an ejection fraction of 13 % by Gabriel's method of disks. Global left ventricular hypokinesis.   2. Left ventricular global longitudinal strain is -4.0 % is abnormal (> -16%). Images were acquired on a [employer] ultrasound system and processed on the ultrasound machine with a heart rate of 91 bpm and a blood pressure of 122/88 mmHg.   3. Normal right ventricular cavity size and reduced right ventricular systolic function.   4. Structurally normal mitral valve with normal leaflet excursion. There is calcification of the mitral valve annulus. There is moderate to severe mitral regurgitation.   5. The left atrium is moderately dilated with an indexed volume of 46.70 ml/m².   6. No prior echocardiogram is available for comparison.    < end of copied text >	  LABS:	 	                          11.9   3.91  )-----------( 221      ( 22 Sep 2024 03:30 )             35.3     09-22    140  |  98  |  26[H]  ----------------------------<  92  4.1   |  31  |  1.16    Ca    9.1      22 Sep 2024 03:30  Phos  4.2     09-22  Mg     1.70     09-22      A/P) She is a pleasant 75 y/o female PMH hypertension admitted with newly diagnosed severe LV dysfunction (LVEF 10-15%) resulting in severe functional MR. Cath pending. EP is now called for brief episodes of NSVT. She denies syncope nor cardiac arrest.    -continue losartan, toprol and aldactone for chronic systolic CHF  -get cath.  impressed upon patient the importance of completing this minimally-invasive and safe procedure, which she continues to refuse.  unable to explain her concerns about invasive cardiac workup... simply refuses this care pathway.  -repeat echo after 3 months of GDMT to determine candidacy for a primary prevention ICD.  if she has an unrevascularized ischemic cardiomyopathy, she is unlikely to see any significant improvement on medication alone, and is more likely to experience sudden cardiac arrest.  -no need for LifeVest  -treat NSVT with escalating doses of toprol  -remainder of care in cardiology office on followup.    Que Daniels M.D.  Cardiac Electrophysiology  [phone]
[facility] NEPHROLOGY- PROGRESS NOTE    74y Female with history of HTN presents with SOB. Nephrology consulted for volume overload.    REVIEW OF SYSTEMS:  Gen: no fevers  Cards: no chest pain  Resp: + dyspnea improving  GI: no nausea or vomiting or diarrhea  Vascular: + LE edema improving    No Known Allergies      Hospital Medications: Medications reviewed      VITALS:  T(F): 97.9 (09-22-24 @ 05:40), Max: 98.4 (09-21-24 @ 16:00)  HR: 85 (09-22-24 @ 05:40)  BP: 117/73 (09-22-24 @ 05:40)  RR: 16 (09-22-24 @ 05:40)  SpO2: 100% (09-22-24 @ 05:40)  Wt(kg): --    09-21 @ 07:01  -  09-22 @ 07:00  --------------------------------------------------------  IN: 708 mL / OUT: 3350 mL / NET: -2642 mL    09-22 @ 07:01  -  09-22 @ 12:11  --------------------------------------------------------  IN: 236 mL / OUT: 0 mL / NET: 236 mL        PHYSICAL EXAM:    Gen: NAD, calm  Cards: RRR, +S1/S2, no M/G/R  Resp: CTA B/L  GI: soft, NT/ND, NABS  Vascular: trace LE edema B/L      LABS:  09-22    140  |  98  |  26[H]  ----------------------------<  92  4.1   |  31  |  1.16    Ca    9.1      22 Sep 2024 03:30  Phos  4.2     09-22  Mg     1.70     09-22      Creatinine Trend: 1.16 <--, 1.17 <--, 1.30 <--, 1.16 <--                        11.9   3.91  )-----------( 221      ( 22 Sep 2024 03:30 )             35.3     Urine Studies:  Urinalysis Basic - ( 22 Sep 2024 03:30 )    Color:  / Appearance:  / SG:  / pH:   Gluc: 92 mg/dL / Ketone:   / Bili:  / Urobili:    Blood:  / Protein:  / Nitrite:    Leuk Esterase:  / RBC:  / WBC    Sq Epi:  / Non Sq Epi:  / Bacteria:       Sodium, Random Urine: 115 mmol/L (09-20 @ 09:58)  Creatinine, Random Urine: 12 mg/dL (09-20 @ 09:58)  Protein/Creatinine Ratio Calculation: Unable to Calc Ratio (09-20 @ 09:58)    
Patient is a 74y old  Female who presents with a chief complaint of suspected new onset CHF (22 Sep 2024 14:18)      SUBJECTIVE / OVERNIGHT EVENTS:    Events noted.  CONSTITUTIONAL: No fever,  or fatigue  RESPIRATORY: No cough, wheezing,  No shortness of breath  CARDIOVASCULAR: No chest pain, palpitations, dizziness, or leg swelling  GASTROINTESTINAL: No abdominal or epigastric pain. No nausea, vomiting.  NEUROLOGICAL: No headache    MEDICATIONS  (STANDING):  furosemide    Tablet 80 milliGRAM(s) Oral daily  heparin   Injectable 5000 Unit(s) SubCutaneous every 8 hours  losartan 25 milliGRAM(s) Oral at bedtime  metoprolol succinate ER 25 milliGRAM(s) Oral daily  spironolactone 25 milliGRAM(s) Oral daily    MEDICATIONS  (PRN):  acetaminophen     Tablet .. 650 milliGRAM(s) Oral every 6 hours PRN Temp greater or equal to 38C (100.4F), Mild Pain (1 - 3), Moderate Pain (4 - 6)        CAPILLARY BLOOD GLUCOSE        I&O's Summary    21 Sep 2024 07:01  -  22 Sep 2024 07:00  --------------------------------------------------------  IN: 708 mL / OUT: 3350 mL / NET: -2642 mL    22 Sep 2024 07:01  -  22 Sep 2024 16:58  --------------------------------------------------------  IN: 472 mL / OUT: 0 mL / NET: 472 mL        T(C): 36.3 (09-22-24 @ 12:30), Max: 36.6 (09-21-24 @ 21:21)  HR: 94 (09-22-24 @ 12:30) (85 - 105)  BP: 119/79 (09-22-24 @ 12:30) (117/73 - 129/78)  RR: 17 (09-22-24 @ 12:30) (16 - 17)  SpO2: 98% (09-22-24 @ 12:30) (98% - 100%)    PHYSICAL EXAM:    NECK: Supple, No JVD  CHEST/LUNG: Clear to auscultation bilaterally; No wheezing.  HEART: Regular rate and rhythm; No murmurs, rubs, or gallops  ABDOMEN: Soft, Nontender, Nondistended; Bowel sounds present  EXTREMITIES:   No edema  NEUROLOGY: AAO X 3      LABS:                        11.9   3.91  )-----------( 221      ( 22 Sep 2024 03:30 )             35.3     09-22    140  |  98  |  26[H]  ----------------------------<  92  4.1   |  31  |  1.16    Ca    9.1      22 Sep 2024 03:30  Phos  4.2     09-22  Mg     1.70     09-22            Urinalysis Basic - ( 22 Sep 2024 03:30 )    Color: x / Appearance: x / SG: x / pH: x  Gluc: 92 mg/dL / Ketone: x  / Bili: x / Urobili: x   Blood: x / Protein: x / Nitrite: x   Leuk Esterase: x / RBC: x / WBC x   Sq Epi: x / Non Sq Epi: x / Bacteria: x      CAPILLARY BLOOD GLUCOSE            RADIOLOGY & ADDITIONAL TESTS:    Imaging Personally Reviewed:    Consultant(s) Notes Reviewed:      Care Discussed with Consultants/Other Providers:    Aroldo Guillory MD, CMD, FACP    [id number] [place] [address]  Office Tel: [phone]  Cell: [phone]  
Date of Service: 09-23-24 @ 09:19    Patient is a 74y old  Female who presents with a chief complaint of suspected new onset CHF (22 Sep 2024 14:58)      Any change in ROS: seems pretty good:   for cath today  :  she says she feels much better;       MEDICATIONS  (STANDING):  furosemide    Tablet 80 milliGRAM(s) Oral daily  heparin   Injectable 5000 Unit(s) SubCutaneous every 8 hours  losartan 25 milliGRAM(s) Oral at bedtime  metoprolol succinate ER 25 milliGRAM(s) Oral daily  spironolactone 25 milliGRAM(s) Oral daily    MEDICATIONS  (PRN):  acetaminophen     Tablet .. 650 milliGRAM(s) Oral every 6 hours PRN Temp greater or equal to 38C (100.4F), Mild Pain (1 - 3), Moderate Pain (4 - 6)    Vital Signs Last 24 Hrs  T(C): 36.7 (23 Sep 2024 05:50), Max: 36.8 (22 Sep 2024 18:02)  T(F): 98 (23 Sep 2024 05:50), Max: 98.3 (22 Sep 2024 18:02)  HR: 99 (23 Sep 2024 05:50) (94 - 99)  BP: 130/85 (23 Sep 2024 05:50) (115/62 - 130/85)  BP(mean): --  RR: 16 (23 Sep 2024 05:50) (16 - 18)  SpO2: 95% (23 Sep 2024 05:50) (95% - 100%)    Parameters below as of 23 Sep 2024 05:50  Patient On (Oxygen Delivery Method): room air        I&O's Summary    22 Sep 2024 07:01  -  23 Sep 2024 07:00  --------------------------------------------------------  IN: 712 mL / OUT: 0 mL / NET: 712 mL          Physical Exam:   GENERAL: NAD, well-groomed, well-developed  HEENT: JEFFREY/   Atraumatic, Normocephalic  ENMT: No tonsillar erythema, exudates, or enlargement; Moist mucous membranes, Good dentition, No lesions  NECK: Supple, No JVD, Normal thyroid  CHEST/LUNG: Clear to auscultaion  CVS: Regular rate and rhythm; No murmurs, rubs, or gallops  GI: : Soft, Nontender, Nondistended; Bowel sounds present  NERVOUS SYSTEM:  Alert & Oriented X3  EXTREMITIES: - edema  LYMPH: No lymphadenopathy noted  SKIN: No rashes or lesions  ENDOCRINOLOGY: No Thyromegaly  PSYCH: Appropriate    Labs:                              11.3   3.92  )-----------( 227      ( 23 Sep 2024 04:38 )             33.3                         11.9   3.91  )-----------( 221      ( 22 Sep 2024 03:30 )             35.3                         10.6   4.91  )-----------( 200      ( 21 Sep 2024 05:21 )             31.4                         10.3   4.77  )-----------( 182      ( 20 Sep 2024 04:20 )             31.0     09-23    140  |  99  |  25[H]  ----------------------------<  98  3.9   |  27  |  1.11  09-22    140  |  98  |  26[H]  ----------------------------<  92  4.1   |  31  |  1.16  09-21    141  |  101  |  26[H]  ----------------------------<  103[H]  3.7   |  26  |  1.17  09-20    141  |  101  |  25[H]  ----------------------------<  91  3.9   |  26  |  1.30    Ca    8.8      23 Sep 2024 04:38  Ca    9.1      22 Sep 2024 03:30  Phos  3.7     09-23  Phos  4.2     09-22  Mg     1.80     09-23  Mg     1.70     09-22      CAPILLARY BLOOD GLUCOSE              Urinalysis Basic - ( 23 Sep 2024 04:38 )    Color: x / Appearance: x / SG: x / pH: x  Gluc: 98 mg/dL / Ketone: x  / Bili: x / Urobili: x   Blood: x / Protein: x / Nitrite: x   Leuk Esterase: x / RBC: x / WBC x   Sq Epi: x / Non Sq Epi: x / Bacteria: x    rad< from: CT Chest No Cont (09.18.24 @ 17:50) >  FINDINGS:    LYMPH NODES: No lymphadenopathy.    HEART/VASCULATURE: The heart is enlarged. No pericardial effusion. The   aorta is normal in caliber.  There are aortic calcifications.    AIRWAYS/LUNGS/PLEURA: No endobronchial lesion. Bilateral perihilar   groundglass opacities. Moderate right and trace left pleural effusions   and passive atelectasis.    UPPER ABDOMEN: Unremarkable.    BONES/SOFT TISSUES: Degenerative changes.    IMPRESSION:  No evidence of pneumonia.    Moderate right and trace left pleural effusions and perihilar ground   glass opacities consistent with congestion.    ---End of Report ---          PRERNA PRIEST MD; Resident Radiologist  This document has been electronically signed.  BERNA JAY MD; Attending Radiologist  This document has been electronically signed. Sep 18 2024  6:12PM    < end of copied text >          RECENT CULTURES:        RESPIRATORY CULTURES:          Studies  Chest X-RAY  CT SCAN Chest   Venous Dopplers: LE:   CT Abdomen  Others              
[facility] NEPHROLOGY- PROGRESS NOTE    74y Female with history of HTN presents with SOB. Nephrology consulted for volume overload.    REVIEW OF SYSTEMS:  Gen: no fevers  Cards: no chest pain  Resp: + dyspnea improving  GI: no nausea or vomiting or diarrhea  Vascular: + LE edema improving    No Known Allergies      Hospital Medications: Medications reviewed      VITALS:  T(F): 98.6 (09-21-24 @ 08:25), Max: 98.6 (09-21-24 @ 08:25)  HR: 100 (09-21-24 @ 08:25)  BP: 119/95 (09-21-24 @ 08:25)  RR: 18 (09-21-24 @ 08:25)  SpO2: 100% (09-21-24 @ 08:25)  Wt(kg): --    09-20 @ 07:01  -  09-21 @ 07:00  --------------------------------------------------------  IN: 708 mL / OUT: 2000 mL / NET: -1292 mL    09-21 @ 07:01  -  09-21 @ 12:55  --------------------------------------------------------  IN: 236 mL / OUT: 0 mL / NET: 236 mL        PHYSICAL EXAM:    Gen: NAD, calm  Cards: RRR, +S1/S2, no M/G/R  Resp: CTA B/L  GI: soft, NT/ND, NABS  Vascular: 1+ RLE edema > LLE edema      LABS:  09-21    141  |  101  |  26[H]  ----------------------------<  103[H]  3.7   |  26  |  1.17    Ca    8.8      21 Sep 2024 05:21  Phos  4.8     09-21  Mg     1.70     09-21      Creatinine Trend: 1.17 <--, 1.30 <--, 1.16 <--                        10.6   4.91  )-----------( 200      ( 21 Sep 2024 05:21 )             31.4     Urine Studies:  Urinalysis Basic - ( 21 Sep 2024 05:21 )    Color:  / Appearance:  / SG:  / pH:   Gluc: 103 mg/dL / Ketone:   / Bili:  / Urobili:    Blood:  / Protein:  / Nitrite:    Leuk Esterase:  / RBC:  / WBC    Sq Epi:  / Non Sq Epi:  / Bacteria:       Sodium, Random Urine: 115 mmol/L (09-20 @ 09:58)  Creatinine, Random Urine: 12 mg/dL (09-20 @ 09:58)  Protein/Creatinine Ratio Calculation: Unable to Calc Ratio (09-20 @ 09:58)        < from: US Kidney and Bladder (09.20.24 @ 20:10) >  FINDINGS:  Right kidney: 8.7 cm. No renal mass, hydronephrosis or calculi.    Left kidney: 8.0 cm. No renal mass, hydronephrosis or calculi.    Urinary bladder: Minimally distended, limiting evaluation.    Miscellaneous: Right pleural effusion.    IMPRESSION:  No hydronephrosis.    Right pleural effusion.        --- End of Report ---    < end of copied text >  
[facility] NEPHROLOGY- PROGRESS NOTE    74y Female with history of HTN presents with SOB. Nephrology consulted for volume overload.    REVIEW OF SYSTEMS:  Gen: no fevers  Cards: no chest pain  Resp: + dyspnea improving  GI: no nausea or vomiting or diarrhea  Vascular: + LE edema improving    No Known Allergies      Hospital Medications: Medications reviewed    VITALS:  T(F): 98.2 (24 @ 06:20), Max: 98.2 (24 @ 18:20)  HR: 88 (24 @ 06:20)  BP: 122/88 (24 @ 06:20)  RR: 18 (24 @ 06:20)  SpO2: 100% (24 @ 06:20)  Wt(kg): --  Height (cm): 167.6 ( @ :20)  Weight (kg): 88 ( @ 09:45)  BMI (kg/m2): 31.3 ( 06:20)  BSA (m2): 1.97 ( 06:20)     @ 07:01  -   @ 07:00  --------------------------------------------------------  IN: 800 mL / OUT: 1500 mL / NET: -700 mL     @ 07:01  -   @ 12:43  --------------------------------------------------------  IN: 236 mL / OUT: 0 mL / NET: 236 mL        PHYSICAL EXAM:    Gen: NAD, calm  Cards: RRR, +S1/S2, no M/G/R  Resp: CTA B/L  GI: soft, NT/ND, NABS  Vascular: 1+ RLE edema > LLE edema    LABS:      141  |  101  |  25[H]  ----------------------------<  91  3.9   |  26  |  1.30    Ca    8.7      20 Sep 2024 04:20  Phos  4.4       Mg     1.80           Creatinine Trend: 1.30 <--, 1.16 <--                        10.3   4.77  )-----------( 182      ( 20 Sep 2024 04:20 )             31.0     Urine Studies:  Urinalysis Basic - ( 20 Sep 2024 09:58 )    Color: Yellow / Appearance: Clear / S.006 / pH:   Gluc:  / Ketone: Negative mg/dL  / Bili: Negative / Urobili: 0.2 mg/dL   Blood:  / Protein: Negative mg/dL / Nitrite: Negative   Leuk Esterase: Negative / RBC: 0 /HPF / WBC 1 /HPF   Sq Epi:  / Non Sq Epi: 0 /HPF / Bacteria: Negative /HPF      Sodium, Random Urine: 115 mmol/L ( @ 09:58)  Creatinine, Random Urine: 12 mg/dL ( @ 09:58)  Protein/Creatinine Ratio Calculation: Unable to Calc Ratio ( @ 09:58)      RADIOLOGY & ADDITIONAL STUDIES:
Date of Service: 09-22-24 @ 11:39    Patient is a 74y old  Female who presents with a chief complaint of suspected new onset CHF (22 Sep 2024 10:37)      Any change in ROS: She is doing pretty good:   no sob:  no cough :     MEDICATIONS  (STANDING):  furosemide   Injectable 40 milliGRAM(s) IV Push two times a day  heparin   Injectable 5000 Unit(s) SubCutaneous every 8 hours  losartan 25 milliGRAM(s) Oral at bedtime  metoprolol succinate ER 25 milliGRAM(s) Oral daily  spironolactone 25 milliGRAM(s) Oral daily    MEDICATIONS  (PRN):  acetaminophen     Tablet .. 650 milliGRAM(s) Oral every 6 hours PRN Temp greater or equal to 38C (100.4F), Mild Pain (1 - 3), Moderate Pain (4 - 6)    Vital Signs Last 24 Hrs  T(C): 36.6 (22 Sep 2024 05:40), Max: 36.9 (21 Sep 2024 16:00)  T(F): 97.9 (22 Sep 2024 05:40), Max: 98.4 (21 Sep 2024 16:00)  HR: 85 (22 Sep 2024 05:40) (85 - 105)  BP: 117/73 (22 Sep 2024 05:40) (117/73 - 129/78)  BP(mean): --  RR: 16 (22 Sep 2024 05:40) (16 - 18)  SpO2: 100% (22 Sep 2024 05:40) (100% - 100%)    Parameters below as of 22 Sep 2024 05:40  Patient On (Oxygen Delivery Method): room air        I&O's Summary    21 Sep 2024 07:01  -  22 Sep 2024 07:00  --------------------------------------------------------  IN: 708 mL / OUT: 3350 mL / NET: -2642 mL    22 Sep 2024 07:01  -  22 Sep 2024 11:39  --------------------------------------------------------  IN: 236 mL / OUT: 0 mL / NET: 236 mL          Physical Exam:   GENERAL: NAD, well-groomed, well-developed  HEENT: JEFFREY/   Atraumatic, Normocephalic  ENMT: No tonsillar erythema, exudates, or enlargement; Moist mucous membranes, Good dentition, No lesions  NECK: Supple, No JVD, Normal thyroid  CHEST/LUNG: Clear to auscultaion-  CVS: Regular rate and rhythm; No murmurs, rubs, or gallops  GI: : Soft, Nontender, Nondistended; Bowel sounds present  NERVOUS SYSTEM:  Alert & Oriented X3  EXTREMITIES: Mild  edema  LYMPH: No lymphadenopathy noted  SKIN: No rashes or lesions  ENDOCRINOLOGY: No Thyromegaly  PSYCH: Appropriate    Labs:                              11.9   3.91  )-----------( 221      ( 22 Sep 2024 03:30 )             35.3                         10.6   4.91  )-----------( 200      ( 21 Sep 2024 05:21 )             31.4                         10.3   4.77  )-----------( 182      ( 20 Sep 2024 04:20 )             31.0                         10.3   5.39  )-----------( 178      ( 19 Sep 2024 04:49 )             30.9     09-22    140  |  98  |  26[H]  ----------------------------<  92  4.1   |  31  |  1.16  09-21    141  |  101  |  26[H]  ----------------------------<  103[H]  3.7   |  26  |  1.17  09-20    141  |  101  |  25[H]  ----------------------------<  91  3.9   |  26  |  1.30    Ca    9.1      22 Sep 2024 03:30  Ca    8.8      21 Sep 2024 05:21  Phos  4.2     09-22  Phos  4.8     09-21  Mg     1.70     09-22  Mg     1.70     09-21      CAPILLARY BLOOD GLUCOSE              Urinalysis Basic - ( 22 Sep 2024 03:30 )    Color: x / Appearance: x / SG: x / pH: x  Gluc: 92 mg/dL / Ketone: x  / Bili: x / Urobili: x   Blood: x / Protein: x / Nitrite: x   Leuk Esterase: x / RBC: x / WBC x   Sq Epi: x / Non Sq Epi: x / Bacteria: x      rad< from: CT Chest No Cont (09.18.24 @ 17:50) >    FINDINGS:    LYMPH NODES: No lymphadenopathy.    HEART/VASCULATURE: The heart is enlarged. No pericardial effusion. The   aorta is normal in caliber.  There are aortic calcifications.    AIRWAYS/LUNGS/PLEURA: No endobronchial lesion. Bilateral perihilar   groundglass opacities. Moderate right and trace left pleural effusions   and passive atelectasis.    UPPER ABDOMEN: Unremarkable.    BONES/SOFT TISSUES: Degenerative changes.    IMPRESSION:  No evidence of pneumonia.    Moderate right and trace left pleural effusions and perihilar ground   glass opacities consistent with congestion.    ---End of Report ---          PRERNA PRIEST MD; Resident Radiologist  This document has been electronically signed.  BERNA JAY MD; Attending Radiologist  This document has been electronically signed. Sep 18 2024  6:12PM    < end of copied text >        RECENT CULTURES:        RESPIRATORY CULTURES:          Studies  Chest X-RAY  CT SCAN Chest   Venous Dopplers: LE:   CT Abdomen  Others              
HPI:  74-year-old female PMH of hypertension on losartan, presenting to ED with increasing shortness of breath/dyspnea on exertion/ orthopnea  over past 3 days associated with bilateral lower extremity edema.  Patient states her leg swelling normally resolves after elevating her feet however they did not resolve and worsened this morning in addition to her breathing prompting ER eval.  Patient denies any chest pain, fevers, cough, sore throat, and/V/D/C, abdominal pain, recent illness.  Patient otherwise denies any recent travel, long car rides, leg swelling, prolonged immobilization, recent surgeries, history of DVT/PE or exogenous hormone use. Duplex neg for dvt. EKG NSR with lateral twi (no prior for comparison) Trop 29, 28; pBNP  approx 18k. CXR + BL pl eff, R>L  Denies h/o cad, or prior cad workup (18 Sep 2024 17:19)        No events   PAST MEDICAL & SURGICAL HISTORY:  HTN (hypertension)          Review of Systems:   CONSTITUTIONAL: No fever, weight loss, or fatigue  EYES: No eye pain, visual disturbances, or discharge  ENMT:  No difficulty hearing, tinnitus, vertigo; No sinus or throat pain  NECK: No pain or stiffness  BREASTS: No pain, masses, or nipple discharge  RESPIRATORY: No cough, wheezing, chills or hemoptysis; No shortness of breath  CARDIOVASCULAR: No chest pain, palpitations, dizziness, or leg swelling  GASTROINTESTINAL: No abdominal or epigastric pain. No nausea, vomiting, or hematemesis; No diarrhea or constipation. No melena or hematochezia.  GENITOURINARY: No dysuria, frequency, hematuria, or incontinence  NEUROLOGICAL: No headaches, memory loss, loss of strength, numbness, or tremors  SKIN: No itching, burning, rashes, or lesions   LYMPH NODES: No enlarged glands  ENDOCRINE: No heat or cold intolerance; No hair loss  MUSCULOSKELETAL: No joint pain or swelling; No muscle, back, or extremity pain  PSYCHIATRIC: No depression, anxiety, mood swings, or difficulty sleeping  HEME/LYMPH: No easy bruising, or bleeding gums  ALLERY AND IMMUNOLOGIC: No hives or eczema    Allergies    No Known Allergies    Intolerances        Social History:     FAMILY HISTORY:      MEDICATIONS  (STANDING):  enoxaparin Injectable 40 milliGRAM(s) SubCutaneous every 24 hours  furosemide   Injectable 40 milliGRAM(s) IV Push two times a day    MEDICATIONS  (PRN):  acetaminophen     Tablet .. 650 milliGRAM(s) Oral every 6 hours PRN Temp greater or equal to 38C (100.4F), Mild Pain (1 - 3), Moderate Pain (4 - 6)        CAPILLARY BLOOD GLUCOSE        I&O's Summary    19 Sep 2024 07:01  -  20 Sep 2024 07:00  --------------------------------------------------------  IN: 800 mL / OUT: 1500 mL / NET: -700 mL    20 Sep 2024 07:01  -  21 Sep 2024 00:03  --------------------------------------------------------  IN: 708 mL / OUT: 1000 mL / NET: -292 mL        PHYSICAL EXAM:  GENERAL: NAD, well-developed  HEAD:  Atraumatic, Normocephalic  EYES: EOMI, PERRLA, conjunctiva and sclera clear  NECK: Supple, No JVD  CHEST/LUNG: Clear to auscultation bilaterally; No wheeze  HEART: Regular rate and rhythm; No murmurs, rubs, or gallops  ABDOMEN: Soft, Nontender, Nondistended; Bowel sounds present  EXTREMITIES:  2+ Peripheral Pulses, No clubbing, cyanosis, or edema  PSYCH: AAOx3  NEUROLOGY: non-focal  SKIN: No rashes or lesions    LABS:                        10.3   4.77  )-----------( 182      ( 20 Sep 2024 04:20 )             31.0     09-20    141  |  101  |  25[H]  ----------------------------<  91  3.9   |  26  |  1.30    Ca    8.7      20 Sep 2024 04:20  Phos  4.4     09-20  Mg     1.80     09-20      PT/INR - ( 19 Sep 2024 04:49 )   PT: 13.8 sec;   INR: 1.24 ratio         PTT - ( 19 Sep 2024 04:49 )  PTT:28.1 sec      Urinalysis Basic - ( 20 Sep 2024 09:58 )    Color: Yellow / Appearance: Clear / S.006 / pH: x  Gluc: x / Ketone: Negative mg/dL  / Bili: Negative / Urobili: 0.2 mg/dL   Blood: x / Protein: Negative mg/dL / Nitrite: Negative   Leuk Esterase: Negative / RBC: 0 /HPF / WBC 1 /HPF   Sq Epi: x / Non Sq Epi: 0 /HPF / Bacteria: Negative /HPF        RADIOLOGY & ADDITIONAL TESTS:    Imaging Personally Reviewed:    Consultant(s) Notes Reviewed:      Care Discussed with Consultants/Other Providers:

## 2024-09-23 NOTE — DISCHARGE NOTE PROVIDER - HOSPITAL COURSE
74-year-old female PMH of hypertension on losartan, presenting to ED with increasing shortness of breath/dyspnea on exertion/ orthopnea  over past 3 days associated with bilateral lower extremity edema.     Acute HFrEF  -- echo noted - severe LV dfx with global hypokinesis  -- volume status improved will transition to 40 mg PO daily lasix  -- c/w Losartan, BB and Aldactone, increase BB as tolerated for NSVT, increase Losartan to 50 mg and increase Bb to 50 mg  -- EP consult for 5 beats NSVT: -switch to po lasix today, continue losartan, toprol and aldactone for chronic systolic CHF, get cath, repeat echo after 3 months of GDMT to determine candidacy for a primary prevention ICD, no need for LifeVest, treat NSVT with escalating doses of toprol.  -- Patient does not want inpatient cath, offered  but she refused, states she wants to go home and wants to shower, she understands the risk of incomplete work-up for reduced EF including arrythmia and death, she prefers to follow up in our office and have outpatient  work-up    Follow up 09/30/2024 at [facility] with Dr. Grullon at 2:30 PM

## 2024-09-23 NOTE — PROGRESS NOTE ADULT - PROVIDER SPECIALTY LIST ADULT
Cardiology
Electrophysiology
Hospitalist
Internal Medicine
Nephrology
Nephrology
Pulmonology
Pulmonology
Cardiology
Internal Medicine
Nephrology
Nephrology
Pulmonology

## 2024-09-23 NOTE — DISCHARGE NOTE NURSING/CASE MANAGEMENT/SOCIAL WORK - PATIENT PORTAL LINK FT
You can access the FollowMyHealth Patient Portal offered by [facility] by registering at the following website: [email or web address]. By joining [facility]’s FollowMyHealth portal, you will also be able to view your health information using other applications (apps) compatible with our system.

## 2024-09-23 NOTE — DISCHARGE NOTE PROVIDER - NSDCMRMEDTOKEN_GEN_ALL_CORE_FT
aspirin 81 mg oral delayed release tablet: 1 tab(s) orally once a day  furosemide 40 mg oral tablet: 1 tab(s) orally once a day  losartan 50 mg oral tablet: 1 tab(s) orally once a day  metoprolol succinate 50 mg oral tablet, extended release: 1 tab(s) orally once a day  spironolactone 25 mg oral tablet: 1 tab(s) orally once a day

## 2024-09-23 NOTE — PROGRESS NOTE ADULT - ASSESSMENT
74y Female with history of HTN presents with SOB. Nephrology consulted for volume overload.    1) CKD-3a: Suspect secondary to HTN (baseline Scr unknown). Scr relatively stable on IV diuretics. UA bland. FeUrea indeterminate. Renal US unremarkable. Avoid nephrotoxins.    2) HTN: BP controlled. Continue with current medications.     3) LE edema: Improving. Can transition diuretics to lasix 40 mg PO daily on 9/23 and would hold on the day of LHC to minimize risk of JOSE. No evidence of proteinuria to suggest nephrotic syndrome. TTE with severe LVSD. Monitor UO.    4) Acute systolic HF: As above.    [facility] NEPHROLOGY  Kannan Gimenez M.D.  Mike Villa D.O.  Kalina Varma M.D.  MD Bernadette Jalloh, MSN, ANP-C    Telephone: [phone]  Facsimile: [phone]    [address] [place] [address], [address]  [place] [address]    
74-year-old female PMH of hypertension on losartan, presenting to ED with increasing shortness of breath/dyspnea on exertion/ orthopnea  over past 3 days associated with bilateral lower extremity edema.  Patient states her leg swelling normally resolves after elevating her feet however they did not resolve and worsened this morning in addition to her breathing prompting ER eval.  Patient denies any chest pain, fevers, cough, sore throat, and/V/D/C, abdominal pain, recent illness.  Patient otherwise denies any recent travel, long car rides, leg swelling, prolonged immobilization, recent surgeries, history of DVT/PE or exogenous hormone use. Duplex neg for dvt. EKG NSR with lateral twi (no prior for comparison) Trop 29, 28; pBNP  approx 18k. CXR + BL pl eff, R>L  Denies h/o cad, or prior cad workup (18 Sep 2024 17:19)    she says she came in with increasing sob at home with increasing leg edema;   she denies having any underlying lung disease;  she never smoked;   -she is saying she is feeling better and her sob is much better     SOB/GREY/LEG EDEMA  HTN      SOB/GREY/LEG EDEMA  -Her sob  is likely due to chf exacerbation;   -her probnp is very high ; on diuretics;   -ct chest with no pneumonia  : has lucien effusions:  r> l;  -I dont think she needs =tap at thi time:   -needs echo : cont lasix;  cards following   -CT scan chest noted;  showed  No evidence of pneumonia. Moderate right and trace left pleural effusions and perihilar ground  glass opacities consistent with congestion.    9/21;  she is doing much better;   on diuretics;   her breathing ismuch better;   I dont think there is any need to tap the fluid at this time  ; she has improved significantly   on aldactone    9/22:  she is doing very good:   pedal edema is much less;   no crackles   she is on room air and is doing very well :         HTN  -controlled; losartan 25 milliGRAM(s) Oral daily    dvt prophylaxis  dw team
74y Female with history of HTN presents with SOB. Nephrology consulted for volume overload.    1) CKD-3a: Suspect secondary to HTN (baseline Scr unknown). Scr relatively stable on IV diuretics. UA bland. FeUrea indeterminate. Renal US unremarkable. Avoid nephrotoxins.    2) HTN: BP controlled. Continue with current medications.     3) LE edema: Improving however patient remains volume overloaded. Continue with lasix 40 mg IV twice daily (defer escalating dose given excellent UO - 1.3L this morning over 5 hours). No evidence of proteinuria to suggest nephrotic syndrome. TTE with severe LVSD. Monitor UO.    4) Acute systolic HF: As above.    [facility] NEPHROLOGY  Kannan Gimenez M.D.  Mike Villa D.O.  Kalina Varma M.D.  MD Bernadette Jalloh, MSN, ANP-C    Telephone: [phone]  Facsimile: [phone] [address], [address]  [place] [address]    
74-year-old female PMH of hypertension on losartan, presenting to ED with increasing shortness of breath/dyspnea on exertion/ orthopnea  over past 3 days associated with bilateral lower extremity edema.      Acute Pulmonary Edema  Acute Systolic Congestive Failure    iv Lasix   Echo reviewed EF 13 %  Pulm HTN/ Pleural Effusions  HTN  Continue Losartan   Pulm Cards  following   Ischemic eval once patient  euvolemic     
74-year-old female PMH of hypertension on losartan, presenting to ED with increasing shortness of breath/dyspnea on exertion/ orthopnea  over past 3 days associated with bilateral lower extremity edema.      Acute Pulmonary Edema  Acute Systolic Congestive Failure    po Lasix   Echo EF 13 %  Pulm HTN/ Pleural Effusions  HTN  Continue Losartan   Pulm Cards Nephro following   Ischemic eval once patient  euvolemic - cath    
74-year-old female PMH of hypertension on losartan, presenting to ED with increasing shortness of breath/dyspnea on exertion/ orthopnea  over past 3 days associated with bilateral lower extremity edema.  Patient states her leg swelling normally resolves after elevating her feet however they did not resolve and worsened this morning in addition to her breathing prompting ER eval.  Patient denies any chest pain, fevers, cough, sore throat, and/V/D/C, abdominal pain, recent illness.  Patient otherwise denies any recent travel, long car rides, leg swelling, prolonged immobilization, recent surgeries, history of DVT/PE or exogenous hormone use. Duplex neg for dvt. EKG NSR with lateral twi (no prior for comparison) Trop 29, 28; pBNP  approx 18k. CXR + BL pl eff, R>L  Denies h/o cad, or prior cad workup (18 Sep 2024 17:19)    she says she came in with increasing sob at home with increasing leg edema;   she denies having any underlying lung disease;  she never smoked;   -she is saying she is feeling better and her sob is much better     SOB/GREY/LEG EDEMA  HTN      SOB/GREY/LEG EDEMA  -Her sob  is likely due to chf exacerbation;   -her probnp is very high ; on diuretics;   -ct chest with no pneumonia  : has lucien effusions:  r> l;  -I dont think she needs =tap at thi time:   -needs echo : cont lasix;  cards following   -CT scan chest noted;  showed  No evidence of pneumonia. Moderate right and trace left pleural effusions and perihilar ground  glass opacities consistent with congestion.    9/21;  she is doing much better;   on diuretics;   her breathing ismuch better;   I dont think there is any need to tap the fluid at this time  ; she has improved significantly   on aldactone      HTN  -controlled; losartan 25 milliGRAM(s) Oral daily    dvt prophylaxis  dw team
74y Female with history of HTN presents with SOB. Nephrology consulted for volume overload.    1) CKD-3a: Suspect secondary to HTN (baseline Scr unknown). Scr relatively stable and likely at baseline. UA bland. FeUrea indeterminate. Renal US unremarkable. Avoid nephrotoxins.    2) HTN: BP controlled. Continue with current medications.     3) LE edema: Improving. Diuretics transitioned to lasix 40 mg PO daily. No evidence of proteinuria to suggest nephrotic syndrome. TTE with severe LVSD. Monitor UO.    4) Acute systolic HF: As above.    [facility] NEPHROLOGY  Kannan Gimenez M.D.  Mike Villa D.O.  Kalina Varma M.D.  MD Bernadette Jalloh, MSN, ANP-C    Telephone: [phone]  Facsimile: [phone] [place] [address], [address]  [place] [address]    
74-year-old female PMH of hypertension on losartan, presenting to ED with increasing shortness of breath/dyspnea on exertion/ orthopnea  over past 3 days associated with bilateral lower extremity edema.      Acute Pulmonary Edema  Acute Systolic Congestive Failure    po Lasix   Echo EF 13 %  Pulm HTN/ Pleural Effusions  HTN  Continue Losartan   Pulm Cards Nephro following     D/C PLANING   Patient refusing in patient Cath        
74-year-old female PMH of hypertension on losartan, presenting to ED with increasing shortness of breath/dyspnea on exertion/ orthopnea  over past 3 days associated with bilateral lower extremity edema.  Patient states her leg swelling normally resolves after elevating her feet however they did not resolve and worsened this morning in addition to her breathing prompting ER eval.  Patient denies any chest pain, fevers, cough, sore throat, and/V/D/C, abdominal pain, recent illness.  Patient otherwise denies any recent travel, long car rides, leg swelling, prolonged immobilization, recent surgeries, history of DVT/PE or exogenous hormone use. Duplex neg for dvt. EKG NSR with lateral twi (no prior for comparison) Trop 29, 28; pBNP  approx 18k. CXR + BL pl eff, R>L  Denies h/o cad, or prior cad workup (18 Sep 2024 17:19)    she says she came in with increasing sob at home with increasing leg edema;   she denies having any underlying lung disease;  she never smoked;   -she is saying she is feeling better and her sob is much better     SOB/GREY/LEG EDEMA  HTN      SOB/GREY/LEG EDEMA  -Her sob  is likely due to chf exacerbation;   -her probnp is very high ; on diuretics;   -ct chest with no pneumonia  : has lucien effusions:  r> l;  -I dont think she needs =tap at thi time:   -needs echo : cont lasix;  cards following   -CT scan chest noted;  showed  No evidence of pneumonia. Moderate right and trace left pleural effusions and perihilar ground  glass opacities consistent with congestion.    9/21;  she is doing much better;   on diuretics;   her breathing ismuch better;   I dont think there is any need to tap the fluid at this time  ; she has improved significantly   on aldactone    9/22:  she is doing very good:   pedal edema is much less;   no crackles   she is on room air and is doing very well :     9/23:  -she seems OK:  for cath today  :  pulm wise she is doing pretty well :  to me the effusions seems small :  -cont diuretics:   -for cath today         HTN  -controlled; losartan 25 milliGRAM(s) Oral daily    dvt prophylaxis  dw tcards
74y Female with history of HTN presents with SOB. Nephrology consulted for volume overload.    1) EVERETT: Suspect EVERETT in setting of CRS given volume overload on exam versus underlying CKD-3a secondary to HTN (baseline Scr unknown). Scr mildly increased this morning for which will change losartan to QHS and hold if Scr continues to increase. UA bland. FeUrea indeterminate. Check baseline renal US. Avoid nephrotoxins.    2) CKD-3a: As above. Monitor electrolytes.    3) HTN: BP controlled. Change losartan to QHS as above.     4) LE edema: Improving. Continue with lasix 40 mg IV twice daily. No evidence of proteinuria to suggest nephrotic syndrome. F/U TTE. Monitor .      [facility] NEPHROLOGY  Kannan Gimenez M.D.  Mike Villa D.O.  Kalina Varma M.D.  MD Bernadette Jalloh, MSN, ANP-C    Telephone: [phone]  Facsimile: [phone] [place] [address], [address]  [place] [address]

## 2024-09-23 NOTE — PHARMACOTHERAPY INTERVENTION NOTE - COMMENTS
Patient counseled on heart failure medication indications, administration, monitoring and side effects. Counseled on fluid and salt restrictions, and maintaining a log of daily weights. Additionally, discussed warning signs of volume overload (shortness of breath, trouble breathing, difficulty with usual activity, edema, rapid weight gain, etc.) and when to seek medical attention. Patient verbalized understanding and CHF booklet/drug information handouts were provided.    Aravind Givens PharmD, [facility]  Clinical Pharmacy Specialist  Spectra: [phone]

## 2024-09-23 NOTE — DISCHARGE NOTE PROVIDER - CARE PROVIDER_API CALL
Magi Grullon  Cardiovascular Disease  [address], [address]  [place] [address]  Phone: [phone]  Fax: [phone]  Scheduled Appointment: 09/30/2024 02:30 PM